# Patient Record
Sex: MALE | Race: WHITE | NOT HISPANIC OR LATINO | Employment: FULL TIME | URBAN - METROPOLITAN AREA
[De-identification: names, ages, dates, MRNs, and addresses within clinical notes are randomized per-mention and may not be internally consistent; named-entity substitution may affect disease eponyms.]

---

## 2017-05-30 ENCOUNTER — LAB CONVERSION - ENCOUNTER (OUTPATIENT)
Dept: OTHER | Facility: OTHER | Age: 42
End: 2017-05-30

## 2017-05-30 LAB
CREATININE, RANDOM URINE (HISTORICAL): 92 MG/DL (ref 20–370)
DEPRECATED RDW RBC AUTO: 14 % (ref 11–15)
HBA1C MFR BLD HPLC: 5.6 % OF TOTAL HGB
HCT VFR BLD AUTO: 46.4 % (ref 38.5–50)
HGB BLD-MCNC: 16 G/DL (ref 13.2–17.1)
MAGNESIUM, UR (HISTORICAL): 0.4 MG/DL
MCH RBC QN AUTO: 31.4 PG (ref 27–33)
MCHC RBC AUTO-ENTMCNC: 34.5 G/DL (ref 32–36)
MCV RBC AUTO: 90.8 FL (ref 80–100)
MICROALBUMIN/CREATININE RATIO (HISTORICAL): 4 MCG/MG CREAT
PLATELET # BLD AUTO: 210 THOUSAND/UL (ref 140–400)
PMV BLD AUTO: 9.1 FL (ref 7.5–12.5)
RBC # BLD AUTO: 5.11 MILLION/UL (ref 4.2–5.8)
TSH SERPL DL<=0.05 MIU/L-ACNC: 1.27 MIU/L (ref 0.4–4.5)
WBC # BLD AUTO: 7.6 THOUSAND/UL (ref 3.8–10.8)

## 2017-06-20 ENCOUNTER — ALLSCRIPTS OFFICE VISIT (OUTPATIENT)
Dept: OTHER | Facility: OTHER | Age: 42
End: 2017-06-20

## 2017-12-11 DIAGNOSIS — R73.01 IMPAIRED FASTING GLUCOSE: ICD-10-CM

## 2017-12-11 DIAGNOSIS — E78.5 HYPERLIPIDEMIA: ICD-10-CM

## 2017-12-18 ENCOUNTER — LAB CONVERSION - ENCOUNTER (OUTPATIENT)
Dept: OTHER | Facility: OTHER | Age: 42
End: 2017-12-18

## 2017-12-18 LAB
A/G RATIO (HISTORICAL): 1.6 (CALC) (ref 1–2.5)
ALBUMIN SERPL BCP-MCNC: 4.5 G/DL (ref 3.6–5.1)
ALP SERPL-CCNC: 64 U/L (ref 40–115)
ALT SERPL W P-5'-P-CCNC: 24 U/L (ref 9–46)
AST SERPL W P-5'-P-CCNC: 15 U/L (ref 10–40)
BILIRUB SERPL-MCNC: 0.4 MG/DL (ref 0.2–1.2)
BUN SERPL-MCNC: 15 MG/DL (ref 7–25)
BUN/CREA RATIO (HISTORICAL): ABNORMAL (CALC) (ref 6–22)
CALCIUM SERPL-MCNC: 9.4 MG/DL (ref 8.6–10.3)
CHLORIDE SERPL-SCNC: 107 MMOL/L (ref 98–110)
CHOLEST SERPL-MCNC: 154 MG/DL
CHOLEST/HDLC SERPL: 5.5 (CALC)
CO2 SERPL-SCNC: 25 MMOL/L (ref 20–31)
CREAT SERPL-MCNC: 0.9 MG/DL (ref 0.6–1.35)
DEPRECATED RDW RBC AUTO: 12.8 % (ref 11–15)
EGFR AFRICAN AMERICAN (HISTORICAL): 121 ML/MIN/1.73M2
EGFR-AMERICAN CALC (HISTORICAL): 104 ML/MIN/1.73M2
GAMMA GLOBULIN (HISTORICAL): 2.8 G/DL (CALC) (ref 1.9–3.7)
GLUCOSE (HISTORICAL): 108 MG/DL (ref 65–99)
HBA1C MFR BLD HPLC: 5.5 % OF TOTAL HGB
HCT VFR BLD AUTO: 45.8 % (ref 38.5–50)
HDLC SERPL-MCNC: 28 MG/DL
HGB BLD-MCNC: 16.1 G/DL (ref 13.2–17.1)
LDL CHOLESTEROL (HISTORICAL): 100 MG/DL (CALC)
MCH RBC QN AUTO: 32.5 PG (ref 27–33)
MCHC RBC AUTO-ENTMCNC: 35.2 G/DL (ref 32–36)
MCV RBC AUTO: 92.3 FL (ref 80–100)
NON-HDL-CHOL (CHOL-HDL) (HISTORICAL): 126 MG/DL (CALC)
PLATELET # BLD AUTO: 202 THOUSAND/UL (ref 140–400)
PMV BLD AUTO: 11 FL (ref 7.5–12.5)
POTASSIUM SERPL-SCNC: 4.1 MMOL/L (ref 3.5–5.3)
RBC # BLD AUTO: 4.96 MILLION/UL (ref 4.2–5.8)
SODIUM SERPL-SCNC: 141 MMOL/L (ref 135–146)
TOTAL PROTEIN (HISTORICAL): 7.3 G/DL (ref 6.1–8.1)
TRIGL SERPL-MCNC: 157 MG/DL
TSH SERPL DL<=0.05 MIU/L-ACNC: 1.15 MIU/L (ref 0.4–4.5)
WBC # BLD AUTO: 7.4 THOUSAND/UL (ref 3.8–10.8)

## 2017-12-19 ENCOUNTER — ALLSCRIPTS OFFICE VISIT (OUTPATIENT)
Dept: OTHER | Facility: OTHER | Age: 42
End: 2017-12-19

## 2017-12-20 NOTE — PROGRESS NOTES
Assessment  1  Hypertension (401 9) (I10)   2  Hyperlipidemia (272 4) (E78 5)   3  Anxiety disorder (300 00) (F41 9)   4  Asthma (493 90) (J45 909)    Plan  Anxiety disorder    · DULoxetine HCl - 20 MG Oral Capsule Delayed Release Particles (Cymbalta);TAKE ONE CAPSULE BY MOUTH EVERY DAY AND THEN WEAN OFF SLOWLY  Asthma    · ProAir  (90 Base) MCG/ACT Inhalation Aerosol Solution; inhale 2 puffs bymouth every 6 hours if needed for shortness of breath or wheezing   · Symbicort 160-4 5 MCG/ACT Inhalation Aerosol; inhale 2 puffs by mouth twice aday  Hyperlipidemia    · Atorvastatin Calcium 40 MG Oral Tablet; TAKE 1 TABLET DAILY   · A diet low in sugar may help your condition ; Status:Complete;   Done: 13NPN8840   · A diet that is low in fat, cholesterol, and sodium is considered a cardiac diet  ;Status:Complete;   Done: 74BXS8074   · Continue with our present treatment plan ; Status:Complete;   Done: 85YWL6134   · Eat a low fat and low cholesterol diet ; Status:Complete;   Done: 89ZMS1750   · Eat no more than 30 grams of fat per day ; Status:Complete;   Done: 99IBU3390   · Some eating tips that can help you lose weight ; Status:Complete;   Done: 12RDW4372   · We recommend that you bring your body mass index down to 26 ; Status:Complete;  Done: 12QLK0002   · We recommend you modify your diet to achieve and maintain a healthy weight  Balaji Saldana may increase your risk for developing health problems such as diabetes,heart disease, and cancer  Avoid high fat foods and eat a balanced diet richin fruits and vegetables  The combination of a reduced-calorie diet and increasedphysical activity is recommended  Please let us know if you would like tolearn more about your nutrition and calorie needs, and additional options includingweight loss programs that can help you achieve your goals ; Status:Complete;   Done:04Doj8679   · (1) CBC/ PLT (NO DIFF); Status:Active;  Requested GLE:93LXM5928;    · (1) COMPREHENSIVE METABOLIC PANEL; Status:Active; Requested OCJ:73EIO4500;    · (1) LIPID PANEL, FASTING; Status:Active; Requested ENR:32KKP3257;    · (1) MICROALBUMIN CREATININE RATIO, RANDOM URINE; Status:Active; Requestedfor:04Jun2018;    · (1) TSH; Status:Active; Requested EPR:89NTS4067;    · Follow-up visit in 6 months Evaluation and Treatment  Follow-up  Status: Hold For -Scheduling  Requested for: 22HXG8847  Hyperlipidemia, Impaired fasting glucose    · (1) HEMOGLOBIN A1C; Status:Active; Requested NRZ:30TPD8573;   Hypertension    · Amlodipine Besylate-Valsartan  MG Oral Tablet (Exforge); TAKE 1 TABLETDAILY    Discussion/Summary    Anxiety has been stable, he will wean off the Cymbalta gradually from 30 milligram to 20 milligram and then slowly taper off,Hypertension stable, hyperlipidemia is not improving he needs to work out and do some weight loss  Asthma has been stable  Hypertension stable, continue same medicationsLabs and follow-up 6 months  The patient was counseled regarding diagnostic results,-- instructions for management,-- risk factor reductions,-- prognosis,-- impressions,-- risks and benefits of treatment options,-- importance of compliance with treatment  Possible side effects of new medications were reviewed with the patient/guardian today  The treatment plan was reviewed with the patient/guardian  The patient/guardian understands and agrees with the treatment plan      Chief Complaint  REVIEW LABS AND MED REFILL      History of Present Illness  He is here review of labs, he has hyperlipidemia hypertension and asthma which has been stable, he has been taking Cymbalta for last 4 years and now he tries to wean off and he thinks his anxiety has been stable  He denies any headache chest pain shortness of breath  And he says he only used Symbicort 1 puff at night and that works for him,      Review of Systems   Constitutional: No fever or chills, feels well, no tiredness, no recent weight gain or weight loss  Eyes: No complaints of eye pain, no red eyes, no discharge from eyes, no itchy eyes  ENT: no complaints of earache, no hearing loss, no nosebleeds, no nasal discharge, no sore throat, no hoarseness  Cardiovascular: No complaints of slow heart rate, no fast heart rate, no chest pain, no palpitations, no leg claudication, no lower extremity  Respiratory: No complaints of shortness of breath, no wheezing, no cough, no SOB on exertion, no orthopnea or PND  Gastrointestinal: No complaints of abdominal pain, no constipation, no nausea or vomiting, no diarrhea or bloody stools  Genitourinary: No complaints of dysuria, no incontinence, no hesitancy, no nocturia, no genital lesion, no testicular pain  Musculoskeletal: No complaints of arthralgia, no myalgias, no joint swelling or stiffness, no limb pain or swelling  Integumentary: No complaints of skin rash or skin lesions, no itching, no skin wound, no dry skin  Neurological: No compliants of headache, no confusion, no convulsions, no numbness or tingling, no dizziness or fainting, no limb weakness, no difficulty walking  Psychiatric: Is not suicidal, no sleep disturbances, no anxiety or depression, no change in personality, no emotional problems  Endocrine: No complaints of proptosis, no hot flashes, no muscle weakness, no erectile dysfunction, no deepening of the voice, no feelings of weakness  Hematologic/Lymphatic: No complaints of swollen glands, no swollen glands in the neck, does not bleed easily, no easy bruising  ROS reviewed  Active Problems  1  Anxiety disorder (300 00) (F41 9)   2  Asthma (493 90) (J45 909)   3  Hyperlipidemia (272 4) (E78 5)   4  Hypertension (401 9) (I10)   5  Impaired fasting glucose (790 21) (R73 01)   6  Influenza vaccine needed (V04 81) (Z23)   7  Ventral hernia (553 20) (K43 9)    Past Medical History  1  Benign essential hypertension (401 1) (I10)   2  History of Cellulitis of ankle (682 6) (L03 119)   3   History of acute pharyngitis (V12 69) (Z87 09)   4  History of acute sinusitis (V12 69) (Z87 09)   5  History of hyperlipidemia (V12 29) (Z86 39)   6  History of Hornet/Wasp/Bee Sting (989 5)   7  History of Swollen L ankle (719 07) (M25 472)   8  History of Tick bites (919 4,E906 4) (W57 XXXA)   9  History of Well adult on routine health check (V70 0) (Z00 00)    The active problems and past medical history were reviewed and updated today  Surgical History  1  History of Hernia Repair    The surgical history was reviewed and updated today  Family History  Mother    1  Family history of Overall Condition: Good  Father    2  Family history of Adult Sleep Apnea   3  Family history of Afib   4  Family history of Stroke    The family history was reviewed and updated today  Social History   · Alcohol Use (History)   · Former smoker (V15 82) (P61 826)   · Marital History - Currently   The social history was reviewed and updated today  Current Meds   1  Amlodipine Besylate-Valsartan  MG Oral Tablet; TAKE 1 TABLET DAILY; Therapy: 20Mar2012 to (Evaluate:02Jlq6686)  Requested for: 20Jun2017; Last Rx:20Jun2017 Ordered   2  Aspir-81 81 MG Oral Tablet Delayed Release; take one tablet once daily; Therapy: 54Sgd0949 to (Evaluate:63Aiq7546); Last Rx:44Flt0621 Ordered   3  Atorvastatin Calcium 40 MG Oral Tablet; TAKE 1 TABLET DAILY; Therapy: 22CYB9829 to (Evaluate:30Fvx1410)  Requested for: 20Jun2017; Last Rx:20Jun2017 Ordered   4  ProAir  (90 Base) MCG/ACT Inhalation Aerosol Solution; inhale 2 puffs by mouth every 6 hours if needed for shortness of breath or wheezing; Therapy: 24GCR8240 to (Evaluate:24Vnw9419)  Requested for: 20Jun2017; Last Rx:20Jun2017 Ordered   5  Symbicort 160-4 5 MCG/ACT Inhalation Aerosol; inhale 2 puffs by mouth twice a day; Therapy: 47GZG6666 to (Evaluate:35Hxz5890)  Requested for: 20Jun2017; Last Rx:20Jun2017 Ordered    The medication list was reviewed and updated today  Allergies  1  Fish-EPA CAPS   2  Percocet TABS    Vitals  Vital Signs    Recorded: 04GIC9085 04:45PM   Heart Rate 89   Systolic 379   Diastolic 80   Height 5 ft 11 in   Weight 228 lb    BMI Calculated 31 8   BSA Calculated 2 23     Physical Exam   Constitutional  General appearance: No acute distress, well appearing and well nourished  Eyes  Conjunctiva and lids: No swelling, erythema, or discharge  Pupils and irises: Equal, round and reactive to light  Ears, Nose, Mouth, and Throat  External inspection of ears and nose: Normal    Nasal mucosa, septum, and turbinates: Normal without edema or erythema  Oropharynx: Abnormal   The posterior pharynx was erythematous  Pulmonary  Respiratory effort: No increased work of breathing or signs of respiratory distress  Auscultation of lungs: Clear to auscultation, equal breath sounds bilaterally, no wheezes, no rales, no rhonci  Cardiovascular  Palpation of heart: Normal PMI, no thrills  Auscultation of heart: Normal rate and rhythm, normal S1 and S2, without murmurs  Examination of extremities for edema and/or varicosities: Normal    Abdomen  Abdomen: Non-tender, no masses  Musculoskeletal  Gait and station: Normal    Digits and nails: Normal without clubbing or cyanosis  Skin  Skin and subcutaneous tissue: Normal without rashes or lesions  Neurologic  Cranial nerves: Cranial nerves 2-12 intact  Sensation: No sensory loss  Psychiatric  Orientation to person, place and time: Normal          Results/Data  (1) COMPREHENSIVE METABOLIC PANEL [id number] 50:64HA Nadia Duverney     Test Name Result Flag Reference   GLUCOSE 108 mg/dL H 65-99   Fasting reference interval   For someone without known diabetes, a glucose value between 100 and 125 mg/dL is consistent with prediabetes and should be confirmed with a follow-up test    UREA NITROGEN (BUN) 15 mg/dL  7-25   CREATININE 0 90 mg/dL  0 60-1 35   eGFR NON-AFR   AMERICAN 104 mL/min/1 73m2  > OR = 60   eGFR  121 mL/min/1 73m2  > OR = 60   BUN/CREATININE RATIO   9-02   NOT APPLICABLE (calc)   SODIUM 141 mmol/L  135-146   POTASSIUM 4 1 mmol/L  3 5-5 3   CHLORIDE 107 mmol/L     CARBON DIOXIDE 25 mmol/L  20-31   CALCIUM 9 4 mg/dL  8 6-10 3   PROTEIN, TOTAL 7 3 g/dL  6 1-8 1   ALBUMIN 4 5 g/dL  3 6-5 1   GLOBULIN 2 8 g/dL (calc)  1 9-3 7   ALBUMIN/GLOBULIN RATIO 1 6 (calc)  1 0-2 5   BILIRUBIN, TOTAL 0 4 mg/dL  0 2-1 2   ALKALINE PHOSPHATASE 64 U/L     AST 15 U/L  10-40   ALT 24 U/L  9-46     (1) LIPID PANEL, FASTING [id number] 08:20AM Liv Darby     Test Name Result Flag Reference   CHOLESTEROL, TOTAL 154 mg/dL  <200   HDL CHOLESTEROL 28 mg/dL L >40   TRIGLICERIDES 703 mg/dL H <150   LDL-CHOLESTEROL 100 mg/dL (calc) H    Reference range: <100   Desirable range <100 mg/dL for patients with CHD or diabetes and <70 mg/dL for diabetic patients with known heart disease  LDL-C is now calculated using the Oswaldo-Celis  calculation, which is a validated novel method providing  better accuracy than the Friedewald equation in the  estimation of LDL-C  Mauricio Philip  [address]  6646;933(36): [phone]  (http://Upshot/faq/FPA977)   CHOL/HDLC RATIO 5 5 (calc) H <5 0   NON HDL CHOLESTEROL 126 mg/dL (calc)  <130   For patients with diabetes plus 1 major ASCVD risk  factor, treating to a non-HDL-C goal of <100 mg/dL  (LDL-C of <70 mg/dL) is considered a therapeutic  option       (Q) CBC (H/H, RBC, INDICES, WBC, PLT) 29JFA1971 08:20AM Katelyn Tom     Test Name Result Flag Reference   WHITE BLOOD CELL COUNT 7 4 Thousand/uL  3 8-10 8   RED BLOOD CELL COUNT 4 96 Million/uL  4 20-5 80   HEMOGLOBIN 16 1 g/dL  13 2-17 1   HEMATOCRIT 45 8 %  38 5-50 0   MCV 92 3 fL  80 0-100 0   MCH 32 5 pg  27 0-33 0   MCHC 35 2 g/dL  32 0-36 0   RDW 12 8 %  11 0-15 0   PLATELET COUNT 958 Thousand/uL  140-400   MPV 11 0 fL  7 5-12 5     (Q) TSH, 3RD GENERATION [id number] 08:20AM Katelyn Tom     Test Name Result Flag Reference   TSH 1 15 mIU/L  0 40-4 50     (Q) HEMOGLOBIN A1c [id number] 08:20AM Liv Darby   REPORT COMMENT: REQ 1 OF 2 FASTING:YES     Test Name Result Flag Reference   HEMOGLOBIN A1c 5 5 % of total Hgb  <5 7   For the purpose of screening for the presence of diabetes:   <5 7%       Consistent with the absence of diabetes 5 7-6 4%    Consistent with increased risk for diabetes             (prediabetes) > or =6 5%  Consistent with diabetes   This assay result is consistent with a decreased risk of diabetes  Currently, no consensus exists regarding use of hemoglobin A1c for diagnosis of diabetes in children  According to American Diabetes Association (ADA) guidelines, hemoglobin A1c <7 0% represents optimal control in non-pregnant diabetic patients  Different metrics may apply to specific patient populations  Standards of Medical Care in Diabetes(ADA)       Signatures   Electronically signed by : SPEEDY Moss ; Dec 19 2017  5:06PM EST                       (Author)

## 2018-01-13 VITALS
DIASTOLIC BLOOD PRESSURE: 74 MMHG | BODY MASS INDEX: 31.92 KG/M2 | RESPIRATION RATE: 16 BRPM | WEIGHT: 228 LBS | SYSTOLIC BLOOD PRESSURE: 136 MMHG | HEIGHT: 71 IN | HEART RATE: 82 BPM

## 2018-01-15 NOTE — RESULT NOTES
Verified Results  (1) COMPREHENSIVE METABOLIC PANEL [id number] 70:12TY Lazaro Fontaine     Test Name Result Flag Reference   GLUCOSE 105 mg/dL H 65-99   Fasting reference interval   UREA NITROGEN (BUN) 13 mg/dL  7-25   CREATININE 0 98 mg/dL  0 60-1 35   eGFR NON-AFR  AMERICAN 95 mL/min/1 73m2  > OR = 60   eGFR AFRICAN AMERICAN 111 mL/min/1 73m2  > OR = 60   BUN/CREATININE RATIO   6-66   NOT APPLICABLE (calc)   SODIUM 141 mmol/L  135-146   POTASSIUM 4 0 mmol/L  3 5-5 3   CHLORIDE 107 mmol/L     CARBON DIOXIDE 22 mmol/L  19-30   CALCIUM 8 9 mg/dL  8 6-10 3   PROTEIN, TOTAL 7 1 g/dL  6 1-8 1   ALBUMIN 4 3 g/dL  3 6-5 1   GLOBULIN 2 8 g/dL (calc)  1 9-3 7   ALBUMIN/GLOBULIN RATIO 1 5 (calc)  1 0-2 5   BILIRUBIN, TOTAL 0 6 mg/dL  0 2-1 2   ALKALINE PHOSPHATASE 60 U/L     AST 20 U/L  10-40   ALT 28 U/L  9-46     (1) LIPID PANEL, FASTING [id number] 08:22AM Liv Darby     Test Name Result Flag Reference   CHOLESTEROL, TOTAL 186 mg/dL  125-200   HDL CHOLESTEROL 29 mg/dL L > OR = 40   TRIGLICERIDES 336 mg/dL H <150   LDL-CHOLESTEROL 112 mg/dL (calc)  <130   Desirable range <100 mg/dL for patients with CHD or  diabetes and <70 mg/dL for diabetic patients with  known heart disease  CHOL/HDLC RATIO 6 4 (calc) H < OR = 5 0   NON HDL CHOLESTEROL 157 mg/dL (calc)     Target for non-HDL cholesterol is 30 mg/dL higher than   LDL cholesterol target       (Q) CBC (H/H, RBC, INDICES, WBC, PLT) [id number] 08:22AM Lazaro Fontaine     Test Name Result Flag Reference   WHITE BLOOD CELL COUNT 6 6 Thousand/uL  3 8-10 8   RED BLOOD CELL COUNT 5 05 Million/uL  4 20-5 80   HEMOGLOBIN 15 3 g/dL  13 2-17 1   HEMATOCRIT 46 9 %  38 5-50 0   MCV 92 8 fL  80 0-100 0   MCH 30 3 pg  27 0-33 0   MCHC 32 6 g/dL  32 0-36 0   RDW 13 9 %  11 0-15 0   PLATELET COUNT 726 Thousand/uL  140-400   MPV 9 8 fL  7 5-11 5     (Q) TSH, 3RD GENERATION [id number] 08:22AM Lazaro Fontaine     Test Name Result Flag Reference   TSH 1 38 mIU/L  0 40-4 50     (Q) HEMOGLOBIN A1c [id number] 08:22AM Liv Darby   REPORT COMMENT:  FASTING:YES     Test Name Result Flag Reference   HEMOGLOBIN A1c 5 9 % of total Hgb H <5 7   According to ADA guidelines, hemoglobin A1c <7 0%  represents optimal control in non-pregnant diabetic  patients  Different metrics may apply to specific  patient populations  Standards of Medical Care in    Diabetes Care  2013;36:s11-s66     For the purpose of screening for the presence of  diabetes  <5 7%       Consistent with the absence of diabetes  5 7-6 4%    Consistent with increased risk for diabetes              (prediabetes)  >or=6 5%    Consistent with diabetes     This assay result is consistent with an increased risk  of diabetes  Currently, no consensus exists for use of hemoglobin  A1c for diagnosis of diabetes for children

## 2018-01-17 NOTE — RESULT NOTES
Verified Results  (1) LIPID PANEL, FASTING 29Oct2016 08:39AM Suni Ray     Test Name Result Flag Reference   CHOLESTEROL, TOTAL 144 mg/dL  125-200   HDL CHOLESTEROL 28 mg/dL L > OR = 40   TRIGLICERIDES 854 mg/dL H <150   LDL-CHOLESTEROL 86 mg/dL (calc)  <130   Desirable range <100 mg/dL for patients with CHD or  diabetes and <70 mg/dL for diabetic patients with  known heart disease  CHOL/HDLC RATIO 5 1 (calc) H < OR = 5 0   NON HDL CHOLESTEROL 116 mg/dL (calc)     Target for non-HDL cholesterol is 30 mg/dL higher than   LDL cholesterol target  (1) COMPREHENSIVE METABOLIC PANEL [id number] 31:80JL Suni Ray     Test Name Result Flag Reference   GLUCOSE 109 mg/dL H 65-99   Fasting reference interval   UREA NITROGEN (BUN) 17 mg/dL  7-25   CREATININE 1 09 mg/dL  0 60-1 35   eGFR NON-AFR  AMERICAN 83 mL/min/1 73m2  > OR = 60   eGFR AFRICAN AMERICAN 97 mL/min/1 73m2  > OR = 60   BUN/CREATININE RATIO   2-55   NOT APPLICABLE (calc)   SODIUM 139 mmol/L  135-146   POTASSIUM 4 1 mmol/L  3 5-5 3   CHLORIDE 104 mmol/L     CARBON DIOXIDE 28 mmol/L  20-31   CALCIUM 9 2 mg/dL  8 6-10 3   PROTEIN, TOTAL 7 0 g/dL  6 1-8 1   ALBUMIN 4 4 g/dL  3 6-5 1   GLOBULIN 2 6 g/dL (calc)  1 9-3 7   ALBUMIN/GLOBULIN RATIO 1 7 (calc)  1 0-2 5   BILIRUBIN, TOTAL 0 5 mg/dL  0 2-1 2   ALKALINE PHOSPHATASE 69 U/L     AST 14 U/L  10-40   ALT 22 U/L  9-46     (Q) HEMOGLOBIN A1c 29Oct2016 08:39AM Liv Darby   REPORT COMMENT:  FASTING:YES     Test Name Result Flag Reference   HEMOGLOBIN A1c 5 9 % of total Hgb H <5 7   According to ADA guidelines, hemoglobin A1c <7 0%  represents optimal control in non-pregnant diabetic  patients  Different metrics may apply to specific  patient populations  Standards of Medical Care in  [phone]  Diabetes Care   2013;36:s11-s66     For the purpose of screening for the presence of  diabetes  <5 7%       Consistent with the absence of diabetes  5 7-6 4%    Consistent with increased risk for diabetes              (prediabetes)  >or=6 5%    Consistent with diabetes     This assay result is consistent with an increased risk  of diabetes  Currently, no consensus exists for use of hemoglobin  A1c for diagnosis of diabetes for children

## 2018-01-23 VITALS
HEIGHT: 71 IN | DIASTOLIC BLOOD PRESSURE: 80 MMHG | BODY MASS INDEX: 31.92 KG/M2 | HEART RATE: 89 BPM | SYSTOLIC BLOOD PRESSURE: 125 MMHG | WEIGHT: 228 LBS

## 2018-02-13 ENCOUNTER — OFFICE VISIT (OUTPATIENT)
Dept: FAMILY MEDICINE CLINIC | Facility: CLINIC | Age: 43
End: 2018-02-13
Payer: COMMERCIAL

## 2018-02-13 VITALS
HEART RATE: 92 BPM | SYSTOLIC BLOOD PRESSURE: 140 MMHG | WEIGHT: 218 LBS | OXYGEN SATURATION: 97 % | RESPIRATION RATE: 16 BRPM | DIASTOLIC BLOOD PRESSURE: 78 MMHG | BODY MASS INDEX: 30.52 KG/M2 | HEIGHT: 71 IN

## 2018-02-13 DIAGNOSIS — R53.83 FATIGUE DUE TO SLEEP PATTERN DISTURBANCE: ICD-10-CM

## 2018-02-13 DIAGNOSIS — R06.83 SNORING: ICD-10-CM

## 2018-02-13 DIAGNOSIS — G47.30 SLEEP APNEA IN ADULT: Primary | ICD-10-CM

## 2018-02-13 DIAGNOSIS — G47.9 FATIGUE DUE TO SLEEP PATTERN DISTURBANCE: ICD-10-CM

## 2018-02-13 PROCEDURE — 99214 OFFICE O/P EST MOD 30 MIN: CPT | Performed by: FAMILY MEDICINE

## 2018-02-13 RX ORDER — AMLODIPINE AND VALSARTAN 10; 160 MG/1; MG/1
TABLET ORAL
Refills: 0 | COMMUNITY
Start: 2017-12-23 | End: 2018-06-19 | Stop reason: SDUPTHER

## 2018-02-13 RX ORDER — DULOXETIN HYDROCHLORIDE 20 MG/1
CAPSULE, DELAYED RELEASE ORAL
Refills: 0 | COMMUNITY
Start: 2017-12-19 | End: 2018-02-13

## 2018-02-13 RX ORDER — DULOXETIN HYDROCHLORIDE 20 MG/1
CAPSULE, DELAYED RELEASE ORAL
COMMUNITY
Start: 2017-12-19 | End: 2018-02-13

## 2018-02-13 RX ORDER — INFLUENZA A VIRUS A/SINGAPORE/GP1908/2015 IVR-180A (H1N1) ANTIGEN (PROPIOLACTONE INACTIVATED), INFLUENZA A VIRUS A/HONG KONG/4801/2014 X-263B (H3N2) ANTIGEN (PROPIOLACTONE INACTIVATED), INFLUENZA B VIRUS B/BRISBANE/46/2015 ANTIGEN (PROPIOLACTONE INACTIVATED), AND INFLUENZA B VIRUS B/PHUKET/3073/2013 BVR-1B ANTIGEN (PROPIOLACTONE INACTIVATED) 15; 15; 15; 15 UG/.5ML; UG/.5ML; UG/.5ML; UG/.5ML
INJECTION, SUSPENSION INTRAMUSCULAR
Refills: 0 | COMMUNITY
Start: 2018-01-07 | End: 2018-06-19

## 2018-02-13 RX ORDER — ATORVASTATIN CALCIUM 40 MG/1
40 TABLET, FILM COATED ORAL DAILY
Refills: 0 | COMMUNITY
Start: 2018-01-27 | End: 2018-06-19 | Stop reason: SDUPTHER

## 2018-02-13 RX ORDER — BUDESONIDE AND FORMOTEROL FUMARATE DIHYDRATE 160; 4.5 UG/1; UG/1
2 AEROSOL RESPIRATORY (INHALATION) 2 TIMES DAILY
Refills: 0 | COMMUNITY
Start: 2017-12-19 | End: 2018-06-19 | Stop reason: SDUPTHER

## 2018-02-13 NOTE — PROGRESS NOTES
Assessment/Plan:    Problem List Items Addressed This Visit     Snoring    Relevant Orders    Ambulatory referral to Sleep Medicine    Diagnostic Sleep Study    Fatigue due to sleep pattern disturbance    Relevant Orders    Ambulatory referral to Sleep Medicine    Diagnostic Sleep Study    Sleep apnea in adult - Primary     Daytime fatigue, snoring and sleep apnea witnessed by the wife, will get the sleep study done         Relevant Orders    Ambulatory referral to Sleep Medicine    Diagnostic Sleep Study          Chief Complaint   Patient presents with    Snoring     discuss sleep apnea       Subjective:   Patient ID: Santiago Thorpe is a 36 y o  male  He says he has been snoring for last 20 years but now lately has been feeling more tired during the daytime and feels more sleepy does not take naps because he works all day and he says that his wife noticed he has been snoring and holding his breath and then start breathing again he also says that he noted on his feet but that only sleeps about 2 hours during the night but he thinks he was sleeping more and he wants to get his sleep study done  He has hypertension and he has asthma he has been doing well with both conditions and he uses inhaler regularly and blood pressure medication  He is a ex-smoker        Review of Systems   Constitutional: Negative for activity change, appetite change, chills, diaphoresis, fatigue (day time), fever and unexpected weight change  HENT: Negative for congestion, dental problem, ear discharge, ear pain, facial swelling, hearing loss, mouth sores, nosebleeds, postnasal drip, rhinorrhea, sinus pain, sinus pressure, sneezing, sore throat, trouble swallowing and voice change  Eyes: Negative for photophobia, pain, discharge, redness and itching  Respiratory: Positive for apnea  Negative for cough, chest tightness, shortness of breath and wheezing           Snoring   Cardiovascular: Negative for chest pain, palpitations and leg swelling  Gastrointestinal: Negative for abdominal distention, abdominal pain, blood in stool, constipation, diarrhea and nausea  Endocrine: Negative for cold intolerance, heat intolerance, polydipsia, polyphagia and polyuria  Genitourinary: Negative for dysuria, flank pain, frequency, hematuria and urgency  Musculoskeletal: Negative for arthralgias, back pain, myalgias and neck pain  Skin: Negative for color change and pallor  Allergic/Immunologic: Negative for environmental allergies and food allergies  Neurological: Negative for dizziness, weakness, light-headedness, numbness and headaches  Hematological: Negative for adenopathy  Does not bruise/bleed easily  Psychiatric/Behavioral: Negative for behavioral problems, sleep disturbance and suicidal ideas  The patient is not nervous/anxious  Objective:  Physical Exam   Constitutional: He appears well-developed and well-nourished  HENT:   Head: Normocephalic and atraumatic  Right Ear: External ear normal    Left Ear: External ear normal    Mouth/Throat: Oropharynx is clear and moist    Eyes: Conjunctivae and EOM are normal  Pupils are equal, round, and reactive to light  No scleral icterus  Neck: Normal range of motion  Neck supple  No thyromegaly present  Cardiovascular: Normal rate, regular rhythm and normal heart sounds  Pulmonary/Chest: Effort normal and breath sounds normal  He has no wheezes  He has no rales  Lymphadenopathy:     He has no cervical adenopathy  Neurological: He is alert  Skin: No rash noted  No erythema  Psychiatric: He has a normal mood and affect            Past Surgical History:   Procedure Laterality Date    HERNIA REPAIR         Family History   Problem Relation Age of Onset    Sleep apnea Father      Adult    Atrial fibrillation Father     Stroke Father          Current Outpatient Prescriptions:     AFLURIA QUADRIVALENT 0 5 ML DAVE, inject 0 5 milliliter intramuscularly, Disp: , Rfl: 0    amLODIPine-valsartan (EXFORGE)  MG per tablet, , Disp: , Rfl: 0    aspirin 81 MG tablet, Take 1 tablet by mouth daily, Disp: , Rfl:     atorvastatin (LIPITOR) 40 mg tablet, Take 40 mg by mouth daily, Disp: , Rfl: 0    PROAIR  (90 Base) MCG/ACT inhaler, INHALE 2 PUFFS EVERY 6 HOURS IF NEEDED FOR SHORTNESS OF BREATH OR WHEEZING, Disp: , Rfl: 0    SYMBICORT 160-4 5 MCG/ACT inhaler, Inhale 2 puffs 2 (two) times a day, Disp: , Rfl: 0    No Known Allergies    Vitals:    02/13/18 1117   BP: 140/78   Pulse: 92   Resp: 16   SpO2: 97%   Weight: 98 9 kg (218 lb)   Height: 5' 11" (1 803 m)

## 2018-03-22 ENCOUNTER — HOSPITAL ENCOUNTER (OUTPATIENT)
Dept: SLEEP CENTER | Facility: CLINIC | Age: 43
Discharge: HOME | End: 2018-03-22
Payer: COMMERCIAL

## 2018-03-22 DIAGNOSIS — G47.9 FATIGUE DUE TO SLEEP PATTERN DISTURBANCE: ICD-10-CM

## 2018-03-22 DIAGNOSIS — R06.83 SNORING: ICD-10-CM

## 2018-03-22 DIAGNOSIS — G47.30 SLEEP APNEA IN ADULT: ICD-10-CM

## 2018-03-22 DIAGNOSIS — R53.83 FATIGUE DUE TO SLEEP PATTERN DISTURBANCE: ICD-10-CM

## 2018-03-22 PROCEDURE — 95810 POLYSOM 6/> YRS 4/> PARAM: CPT

## 2018-03-23 PROBLEM — G47.33 OSA (OBSTRUCTIVE SLEEP APNEA): Status: ACTIVE | Noted: 2018-02-13

## 2018-03-23 NOTE — PROGRESS NOTES
Sleep Study Documentation    Pre-Sleep Study       Sleep testing procedure explained to patient:YES    Patient napped prior to study:NO    Caffeine:Dayshift worker after 12PM   Caffeine use:YES- soda  12 ounces    Alcohol:Dayshift workers after 5PM: Alcohol use:NO    Typical day for patient:YES       Study Documentation  Diagnostic   Snore:Severe  Supplemental O2: no    Minimum SaO2 83%  Baseline SaO2 96%            EKG abnormalities: no     EEG abnormalities: no    Study Terminated:no    Patient classification: employed       Post-Sleep Study    Medication used at bedtime or during sleep study:YES other prescription medications    Patient reports time it took to fall asleep:20 to 30 minutes    Patient reports waking up during study:3 or more times  Patient reports returning to sleep in 10 to 30 minutes  Patient reports sleeping 4 to 6 hours without dreaming  Patient reports sleep during study:typical    Patient rated sleepiness: Somewhat sleepy or tired    PAP treatment:no

## 2018-03-30 ENCOUNTER — TELEPHONE (OUTPATIENT)
Dept: SLEEP CENTER | Facility: CLINIC | Age: 43
End: 2018-03-30

## 2018-03-30 NOTE — TELEPHONE ENCOUNTER
Informed patient of obstructive sleep apnea on sleep study  He will keep consult appointment on 4/4 as scheduled

## 2018-04-04 ENCOUNTER — OFFICE VISIT (OUTPATIENT)
Dept: SLEEP CENTER | Facility: CLINIC | Age: 43
End: 2018-04-04

## 2018-04-04 VITALS
SYSTOLIC BLOOD PRESSURE: 120 MMHG | BODY MASS INDEX: 33.12 KG/M2 | DIASTOLIC BLOOD PRESSURE: 84 MMHG | HEART RATE: 72 BPM | HEIGHT: 71 IN | WEIGHT: 236.6 LBS

## 2018-04-04 DIAGNOSIS — J45.20 MILD INTERMITTENT ASTHMA WITHOUT COMPLICATION: ICD-10-CM

## 2018-04-04 DIAGNOSIS — R06.83 SNORING: ICD-10-CM

## 2018-04-04 DIAGNOSIS — G47.10 HYPERSOMNIA: ICD-10-CM

## 2018-04-04 DIAGNOSIS — R73.01 IMPAIRED FASTING GLUCOSE: ICD-10-CM

## 2018-04-04 DIAGNOSIS — G47.9 FATIGUE DUE TO SLEEP PATTERN DISTURBANCE: ICD-10-CM

## 2018-04-04 DIAGNOSIS — R53.83 FATIGUE DUE TO SLEEP PATTERN DISTURBANCE: ICD-10-CM

## 2018-04-04 DIAGNOSIS — G47.33 OSA (OBSTRUCTIVE SLEEP APNEA): Primary | ICD-10-CM

## 2018-04-04 DIAGNOSIS — I10 ESSENTIAL HYPERTENSION: ICD-10-CM

## 2018-04-04 DIAGNOSIS — E66.9 OBESITY (BMI 30-39.9): ICD-10-CM

## 2018-04-04 NOTE — PROGRESS NOTES
Review of Systems      Genitourinary none   Cardiology none   Gastrointestinal none   Neurology none   Constitutional none   Integumentary none   Psychiatry none   Musculoskeletal none   Pulmonary wheezing   ENT nasal or sinus congestion   Endocrine none   Hematological none

## 2018-04-04 NOTE — PROGRESS NOTES
Consultation - [address]  36 y o  male  :1975  [id number]    Physician Requesting Consult: Tylor Marin MD     Reason for Consult : [At your kind request] I saw this patient for initial sleep evaluation today  A diagnostic sleep study was undertaken and patient is here to review results and treatment options  The study demonstrated an AHI of 35 / hour, higher while supine with minimum oxygen saturation of 83% and he spent 26% of total sleep time with saturations less than 90%  There were also [frequent] spontaneous arousals: Index of 75 / hour  Medications, Past, Family and Social Histories were reviewed  He  has a past medical history of Benign essential hypertension (2006); Cellulitis of ankle; Hyperlipidemia (2006); and Sting from hornet, wasp, or bee  He has a current medication list which includes the following prescription(s): amlodipine-valsartan, aspirin, atorvastatin, proair hfa, symbicort, and afluria quadrivalent  HPI:  He has study was undertaken because of his complaints of constant fatigue and he has wife witnessed apneas  He has also been snoring for around 10 years  He is not aware of breathing difficulties during sleep or modifying factors  He reported no features of movement disorder  He has had 2 episodes of acting out dream content in the remote past   There is no real report of injury to himself or others  He reported no other features of parasomnia  Sleep Routine:  He is spending 7 hr or more in bed  He typically falls asleep in less than 15 min  Sleep is interrupted 0-2 times a night  He awakens with the aid of an alarm feeling rested at 1st but is tired by midmorning  He he has excessive daytime drowsiness and tends to doze off when sedentary at work  He rated himself at Total score: 9 /24 on the [place] Sleepiness Scale  Habits:  [ reports that he has quit smoking   He has never used smokeless tobacco ], [ reports that he drinks alcohol ], [ reports that he does not use drugs ], [he uses limited caffeine ]   Family History:  His father has obstructive sleep apnea    ROS: reviewed & as attached  [Significant for recent upper respiratory infection with mild excessive a shin of his asthma  He states weight has been stable ]     EXAM: Blood pressure 120/84, pulse 72, height 5' 11" (1 803 m), weight 107 kg (236 lb 9 6 oz)  Body mass index is 33 kg/m²  General:This is a [well] groomed male, well appearing [at] their stated age, in no distress  Speech is clear and coherent  Psych: Alert, orientated & cooperative  Mental state appears normal Judgement & insight [are good]  Extremities:Skin is warm and dry  Color and hydration are good  There is [no] pedal edema  Head and neck: Craniofacial anatomy [is normal]  TMJ & Sinuses are normal  Neck Circumference: 42 cm,  [appears thick] and muscular  There [are no abnormal masses or] Lymhadenopathy  Thyroid is [normal]  Trachea is [central]  Nasal airway: [patent]  Septum is [central ] Mucous membranes appeared [normal]  Turbinates are [normal ]  There is [no] rhinorrhea  Oral airway: [is crowded ] Base of tongue is at Modified Mallampati class III  Hard palate [appears normal]  Soft palate [is redundant]  There is [no] tonsillar hypertrophy  Larrie Linger are normal]  CVS: Heart sounds are [regular]  There [are no] murmur[s]  Resp: Effort is [normal]  Air entry is [good] bilaterally  There are [no] wheezes or rales  Abdomen: obese, soft & non-tender  CNS: is intact  Gait is normal  Rombergs is negative  MSK: Muscle bulk, tone and power are [normal]  IMPRESSION:     1  JOSEE (obstructive sleep apnea)  CPAP Study   2  Snoring  Ambulatory referral to Sleep Medicine   3  Fatigue due to sleep pattern disturbance  Ambulatory referral to Sleep Medicine   4  Hypersomnia     5  Obesity (BMI 30-39 9)     6  Mild intermittent asthma without complication     7   Essential hypertension          PLAN:   1  I reviewed results of the Sleep study with the patient  2  With respect to above conditions, I counseled on pathophysiology, diagnosis, treatment options, risks and benefits; inter-relationship and effects on symptoms and comorbidities; risks of no treatment; costs and insurance aspects  3  I also advised on weight reduction  4  Patient elected positive airway pressure therapy and is to be scheduled for a titration study  5  Follow-up to be scheduled after the study to review results and to initiate therapy           Sincerely,    Emory Guerra MD  Board Certified Specialist

## 2018-05-13 ENCOUNTER — HOSPITAL ENCOUNTER (OUTPATIENT)
Dept: SLEEP CENTER | Facility: CLINIC | Age: 43
Discharge: HOME | End: 2018-05-13
Payer: COMMERCIAL

## 2018-05-13 DIAGNOSIS — G47.33 OSA (OBSTRUCTIVE SLEEP APNEA): ICD-10-CM

## 2018-05-13 PROCEDURE — 95811 POLYSOM 6/>YRS CPAP 4/> PARM: CPT

## 2018-05-14 NOTE — PROGRESS NOTES
Sleep Study Documentation    Pre-Sleep Study       Sleep testing procedure explained to patient:YES    Patient napped prior to study:NO    Caffeine:Dayshift worker after 12PM   Caffeine use:YES- soda  12 ounces    Alcohol:Dayshift workers after 5PM: Alcohol use:NO    Typical day for patient:YES       Study Documentation    Treatment   Optimal PAP pressure:  +16  Leak:None  Snore:Eliminated at +10  REM Obtained:yes (at +16 laterally)    Supplemental O2: no    Minimum SaO2:  85%  Baseline SaO2:   95%  Minimum SaO2 at final PAP pressure:  90%    PAP masks tried:  [employer] F20 (med & large), Resmed Quattro (med & large)  PAP mask choice (final):  Resmed Airfit F20 (med) full-face    Mode of Therapy:CPAP     ETCO2:No     CPAP changed to BiPAP:No    EKG abnormalities: no     EEG abnormalities: no    Study Terminated:no    Patient classification: employed       Post-Sleep Study    Medication used at bedtime or during sleep study:NO    Patient reports time it took to fall asleep:20 to 30 minutes    Patient reports waking up during study:3 or more times  Patient reports returning to sleep in 10 to 30 minutes  Patient reports sleeping 4 to 6 hours without dreaming  Patient reports sleep during study:better than usual    Patient rated sleepiness: Not sleepy or tired    PAP treatment:yes: Post PAP treatment patient reports feeling better and  would wear PAP mask at home

## 2018-05-15 ENCOUNTER — TELEPHONE (OUTPATIENT)
Dept: SLEEP CENTER | Facility: CLINIC | Age: 43
End: 2018-05-15

## 2018-05-15 DIAGNOSIS — G47.33 OSA (OBSTRUCTIVE SLEEP APNEA): Primary | ICD-10-CM

## 2018-05-21 ENCOUNTER — OFFICE VISIT (OUTPATIENT)
Dept: SLEEP CENTER | Facility: CLINIC | Age: 43
End: 2018-05-21

## 2018-05-21 ENCOUNTER — HOSPITAL ENCOUNTER (OUTPATIENT)
Dept: SLEEP CENTER | Facility: CLINIC | Age: 43
Discharge: HOME | End: 2018-05-21

## 2018-05-21 VITALS
HEART RATE: 72 BPM | WEIGHT: 233 LBS | HEIGHT: 71 IN | BODY MASS INDEX: 32.62 KG/M2 | SYSTOLIC BLOOD PRESSURE: 110 MMHG | DIASTOLIC BLOOD PRESSURE: 68 MMHG

## 2018-05-21 DIAGNOSIS — I10 ESSENTIAL HYPERTENSION: ICD-10-CM

## 2018-05-21 DIAGNOSIS — G47.10 HYPERSOMNIA: ICD-10-CM

## 2018-05-21 DIAGNOSIS — R73.01 IMPAIRED FASTING GLUCOSE: ICD-10-CM

## 2018-05-21 DIAGNOSIS — J45.20 MILD INTERMITTENT ASTHMA WITHOUT COMPLICATION: ICD-10-CM

## 2018-05-21 DIAGNOSIS — E66.9 OBESITY (BMI 30-39.9): ICD-10-CM

## 2018-05-21 DIAGNOSIS — G47.33 OSA (OBSTRUCTIVE SLEEP APNEA): Primary | ICD-10-CM

## 2018-05-21 NOTE — PROGRESS NOTES
Follow-up Note - [address]  36 y o  male  :1975  [id number]    I saw Eliud Childress in sleep clinic today for his sleep disordered breathing, coexisting sleep complaints & medical conditions  He had a therapeutic] sleep studies [and is here to review results and to initiate therapy]  During the subsequent therapeutic study, sleep disordered breathing was sufficiently remediated with PAP at 16 cm H2O  He was observed during stage REM but not while supine  PFSH, Problem List, Medications & Allergies were reviewed in EMR  Interval changes: [none reported ]   He  has a past medical history of Benign essential hypertension (2006); Cellulitis of ankle; Hyperlipidemia (2006); and Sting from hornet, wasp, or bee  He has a current medication list which includes the following prescription(s): amlodipine-valsartan, aspirin, atorvastatin, proair hfa, symbicort, and afluria quadrivalent  ROS: reviewed see attached  Asthma is controlled  HPI:  Patient had [no] difficulty tolerating positive airway pressure therapy on the titration study night  Reported [no] nasal congestion, [no] mucosal dryness, [no] chest or abdominal discomfort  [He] tolerated positive airway pressure therapy [and] sleep was better than usual and felt [more energy] the next day  Sleep Routine:  [No interval changes:  He is getting 8 hr sleep but is not refreshed ]  [He has [and] rated himself at Total score: 8 /24 on the [place] sleepiness scale ]  He feels like he could nap and nods off when sedentary at his desk  On Exam: /68   Pulse 72   Ht 5' 11" (1 803 m)   Wt 106 kg (233 lb)   BMI 32 50 kg/m²   Neck Circumference: 42cm  Patient is [well groomed] alert, orientated, cooperative [and in no distress]  Mental state [appeared normal]  Physical findings are essentially unchanged as documented in the initial encounter  IMPRESSION:    1  JOSEE (obstructive sleep apnea)     2  Hypersomnia     3  Obesity (BMI 30-39 9)     4  Essential hypertension     5  Impaired fasting glucose     6  Mild intermittent asthma without complication         PLAN:    1  I reviewed results of the Sleep studies with the patient  2  With respect to above conditions, I counseled on pathophysiology, diagnosis, treatment options, risks and benefits; inter-relationship and effects on symptoms and comorbidities; risks of no treatment; costs and insurance aspects  3  Patient elected positive airway pressure therapy and [care coordinated with the [facility] provider for set up in clinic today]  4  Need for compliance with therapy and weight reduction were emphasized  5  Follow-up to be scheduled in 2 months to monitor compliance, progress and to adjust therapy  Thank you for allowing me to participate in the care of this patient  I will keep you apprised of developments      Sincerely,    Authenticated electronically by Teena Ruiz MD on 46/21/88   Board Certified Specialist

## 2018-05-21 NOTE — PROGRESS NOTES
Review of Systems      Genitourinary none   Cardiology none   Gastrointestinal none   Neurology none   Constitutional fatigue and excessive sweating at night   Integumentary none   Psychiatry none   Musculoskeletal none   Pulmonary snoring   ENT ringing in ears   Endocrine none   Hematological none

## 2018-06-04 DIAGNOSIS — E78.5 HYPERLIPIDEMIA: ICD-10-CM

## 2018-06-04 DIAGNOSIS — R73.01 IMPAIRED FASTING GLUCOSE: ICD-10-CM

## 2018-06-11 LAB
ALBUMIN SERPL-MCNC: 4.2 G/DL (ref 3.6–5.1)
ALBUMIN/CREAT UR: 13 MCG/MG CREAT
ALBUMIN/GLOB SERPL: 1.7 (CALC) (ref 1–2.5)
ALP SERPL-CCNC: 55 U/L (ref 40–115)
ALT SERPL-CCNC: 23 U/L (ref 9–46)
AST SERPL-CCNC: 13 U/L (ref 10–40)
BILIRUB SERPL-MCNC: 0.5 MG/DL (ref 0.2–1.2)
BUN SERPL-MCNC: 12 MG/DL (ref 7–25)
BUN/CREAT SERPL: ABNORMAL (CALC) (ref 6–22)
CALCIUM SERPL-MCNC: 9 MG/DL (ref 8.6–10.3)
CHLORIDE SERPL-SCNC: 107 MMOL/L (ref 98–110)
CHOLEST SERPL-MCNC: 137 MG/DL
CHOLEST/HDLC SERPL: 5.3 (CALC)
CO2 SERPL-SCNC: 26 MMOL/L (ref 20–31)
CREAT SERPL-MCNC: 0.99 MG/DL (ref 0.6–1.35)
CREAT UR-MCNC: 31 MG/DL (ref 20–370)
ERYTHROCYTE [DISTWIDTH] IN BLOOD BY AUTOMATED COUNT: 12.8 % (ref 11–15)
GLOBULIN SER CALC-MCNC: 2.5 G/DL (CALC) (ref 1.9–3.7)
GLUCOSE SERPL-MCNC: 105 MG/DL (ref 65–99)
HBA1C MFR BLD: 5.6 % OF TOTAL HGB
HCT VFR BLD AUTO: 43.7 % (ref 38.5–50)
HDLC SERPL-MCNC: 26 MG/DL
HGB BLD-MCNC: 15 G/DL (ref 13.2–17.1)
LDLC SERPL CALC-MCNC: 84 MG/DL (CALC)
MCH RBC QN AUTO: 32.1 PG (ref 27–33)
MCHC RBC AUTO-ENTMCNC: 34.3 G/DL (ref 32–36)
MCV RBC AUTO: 93.6 FL (ref 80–100)
MICROALBUMIN UR-MCNC: 0.4 MG/DL
NONHDLC SERPL-MCNC: 111 MG/DL (CALC)
PLATELET # BLD AUTO: 191 THOUSAND/UL (ref 140–400)
PMV BLD REES-ECKER: 10.9 FL (ref 7.5–12.5)
POTASSIUM SERPL-SCNC: 4.4 MMOL/L (ref 3.5–5.3)
PROT SERPL-MCNC: 6.7 G/DL (ref 6.1–8.1)
RBC # BLD AUTO: 4.67 MILLION/UL (ref 4.2–5.8)
SL AMB EGFR AFRICAN AMERICAN: 108 ML/MIN/1.73M2
SL AMB EGFR NON AFRICAN AMERICAN: 93 ML/MIN/1.73M2
SODIUM SERPL-SCNC: 142 MMOL/L (ref 135–146)
TRIGL SERPL-MCNC: 167 MG/DL
TSH SERPL-ACNC: 1.14 MIU/L (ref 0.4–4.5)
WBC # BLD AUTO: 6.9 THOUSAND/UL (ref 3.8–10.8)

## 2018-06-19 ENCOUNTER — OFFICE VISIT (OUTPATIENT)
Dept: FAMILY MEDICINE CLINIC | Facility: CLINIC | Age: 43
End: 2018-06-19
Payer: COMMERCIAL

## 2018-06-19 VITALS
WEIGHT: 226 LBS | SYSTOLIC BLOOD PRESSURE: 136 MMHG | HEART RATE: 73 BPM | BODY MASS INDEX: 31.64 KG/M2 | OXYGEN SATURATION: 98 % | HEIGHT: 71 IN | DIASTOLIC BLOOD PRESSURE: 72 MMHG | RESPIRATION RATE: 16 BRPM

## 2018-06-19 DIAGNOSIS — G47.33 OSA (OBSTRUCTIVE SLEEP APNEA): ICD-10-CM

## 2018-06-19 DIAGNOSIS — I10 ESSENTIAL HYPERTENSION: ICD-10-CM

## 2018-06-19 DIAGNOSIS — E78.2 MIXED HYPERLIPIDEMIA: ICD-10-CM

## 2018-06-19 DIAGNOSIS — R73.01 IMPAIRED FASTING GLUCOSE: Primary | ICD-10-CM

## 2018-06-19 DIAGNOSIS — Z23 NEED FOR PROPHYLACTIC VACCINATION WITH TETANUS-DIPHTHERIA (TD): ICD-10-CM

## 2018-06-19 DIAGNOSIS — E66.9 OBESITY (BMI 30-39.9): ICD-10-CM

## 2018-06-19 DIAGNOSIS — J45.20 MILD INTERMITTENT ASTHMA WITHOUT COMPLICATION: ICD-10-CM

## 2018-06-19 PROBLEM — G47.9 FATIGUE DUE TO SLEEP PATTERN DISTURBANCE: Status: RESOLVED | Noted: 2018-02-13 | Resolved: 2018-06-19

## 2018-06-19 PROBLEM — R53.83 FATIGUE DUE TO SLEEP PATTERN DISTURBANCE: Status: RESOLVED | Noted: 2018-02-13 | Resolved: 2018-06-19

## 2018-06-19 PROCEDURE — 99214 OFFICE O/P EST MOD 30 MIN: CPT | Performed by: FAMILY MEDICINE

## 2018-06-19 RX ORDER — BUDESONIDE AND FORMOTEROL FUMARATE DIHYDRATE 160; 4.5 UG/1; UG/1
2 AEROSOL RESPIRATORY (INHALATION) 2 TIMES DAILY
Qty: 1 INHALER | Refills: 1 | Status: SHIPPED | OUTPATIENT
Start: 2018-06-19 | End: 2019-03-10 | Stop reason: SDUPTHER

## 2018-06-19 RX ORDER — AMLODIPINE AND VALSARTAN 10; 160 MG/1; MG/1
1 TABLET ORAL DAILY
Qty: 90 TABLET | Refills: 1 | Status: SHIPPED | OUTPATIENT
Start: 2018-06-19 | End: 2018-12-10 | Stop reason: SDUPTHER

## 2018-06-19 RX ORDER — ATORVASTATIN CALCIUM 40 MG/1
40 TABLET, FILM COATED ORAL DAILY
Qty: 90 TABLET | Refills: 1 | Status: SHIPPED | OUTPATIENT
Start: 2018-06-19 | End: 2019-01-02 | Stop reason: SDUPTHER

## 2018-06-19 NOTE — ASSESSMENT & PLAN NOTE
He has HDL is low, discussed with him to lose more weight continue exercise and he will continue statin and low dose aspirin, his family history of stroke and Afib  repeat labs and follow-up 6 months

## 2018-06-19 NOTE — ASSESSMENT & PLAN NOTE
Needs to improve his diet further to improve his A1c, and improve his fasting blood sugar  , continue exercise recheck labs in 6 months

## 2018-06-19 NOTE — ASSESSMENT & PLAN NOTE
He use maintenance inhaler Symbicort only 1 puff daily when he gets more wheezing than he use two  puff daily

## 2018-06-19 NOTE — PROGRESS NOTES
Assessment/Plan:    Problem List Items Addressed This Visit        Endocrine    Impaired fasting glucose - Primary     Needs to improve his diet further to improve his A1c, and improve his fasting blood sugar  , continue exercise recheck labs in 6 months         Relevant Orders    CBC and differential    Comprehensive metabolic panel    Hemoglobin A1C       Respiratory    JOSEE (obstructive sleep apnea)     Currently using CPAP machine and feeling improvement         Asthma     He use maintenance inhaler Symbicort only 1 puff daily when he gets more wheezing than he use two  puff daily         Relevant Medications    SYMBICORT 160-4 5 MCG/ACT inhaler    PROAIR  (90 Base) MCG/ACT inhaler       Cardiovascular and Mediastinum    Hypertension     Hypertension stable on amlodipine and valsartan, continue same         Relevant Medications    amLODIPine-valsartan (EXFORGE)  MG per tablet       Other    Hyperlipidemia     He has HDL is low, discussed with him to lose more weight continue exercise and he will continue statin and low dose aspirin, his family history of stroke and Afib  repeat labs and follow-up 6 months         Relevant Medications    atorvastatin (LIPITOR) 40 mg tablet    Other Relevant Orders    Lipid panel    Obesity (BMI 30-39  9)     He is trying to lose weight, advised to continue exercise and low-fat low carb diet so that he can lose more weight, he needs to improve his HDL         Need for prophylactic vaccination with tetanus-diphtheria (Td)     Td booster given         Relevant Orders    TD VACCINE GREATER THAN OR EQUAL TO 8YO PRESERVATIVE FREE IM          Chief Complaint   Patient presents with    Follow-up       Subjective:   Patient ID: Nathaniel Middleton is a 36 y o  male  He also has sleep apnea and currently using CPAP machine  And he feels much better his fatigue during daytime has improved  And he is getting used to the machine  , he is ex-smoker    And he does regular exercise  Asthma is under control he use Symbicort only 1 puff daily as a maintenance  He is up-to-date on pneumonia vaccine and he is due for his tetanus vaccine  Hypertension   This is a chronic problem  The problem is unchanged  The problem is controlled  Pertinent negatives include no anxiety, blurred vision, chest pain, headaches, malaise/fatigue, neck pain, orthopnea, palpitations, peripheral edema, PND, shortness of breath or sweats  There are no associated agents to hypertension  Risk factors for coronary artery disease include dyslipidemia, obesity and male gender  Past treatments include calcium channel blockers and angiotensin blockers  The current treatment provides significant improvement  There are no compliance problems  There is no history of angina, kidney disease, CAD/MI, CVA, heart failure, PVD or retinopathy  Identifiable causes of hypertension include sleep apnea  There is no history of chronic renal disease  Hyperlipidemia   This is a chronic problem  Recent lipid tests were reviewed and are variable  Exacerbating diseases include obesity  He has no history of chronic renal disease or diabetes  Impaired fasting glucose  Pertinent negatives include no chest pain, myalgias or shortness of breath  Current antihyperlipidemic treatment includes exercise, diet change and statins  The current treatment provides moderate improvement of lipids  Compliance problems include adherence to diet and adherence to exercise  Review of Systems   Constitutional: Negative for activity change, appetite change, chills, diaphoresis, fatigue, fever, malaise/fatigue and unexpected weight change  HENT: Negative for congestion, dental problem, ear discharge, ear pain, facial swelling, hearing loss, mouth sores, nosebleeds, postnasal drip, rhinorrhea, sinus pain, sinus pressure, sneezing, sore throat, trouble swallowing and voice change      Eyes: Negative for blurred vision, photophobia, pain, discharge, redness and itching  Respiratory: Negative for cough, chest tightness, shortness of breath and wheezing  Cardiovascular: Negative for chest pain, palpitations, orthopnea, leg swelling and PND  Gastrointestinal: Negative for abdominal distention, abdominal pain, blood in stool, constipation, diarrhea and nausea  Endocrine: Negative for cold intolerance, heat intolerance, polydipsia, polyphagia and polyuria  Genitourinary: Negative for dysuria, flank pain, frequency, hematuria and urgency  Musculoskeletal: Negative for arthralgias, back pain, myalgias and neck pain  Skin: Negative for color change and pallor  Allergic/Immunologic: Negative for environmental allergies and food allergies  Neurological: Negative for dizziness, weakness, light-headedness, numbness and headaches  Hematological: Negative for adenopathy  Does not bruise/bleed easily  Psychiatric/Behavioral: Negative for behavioral problems, sleep disturbance and suicidal ideas  The patient is not nervous/anxious  Objective:  Physical Exam   Constitutional: He is oriented to person, place, and time  He appears well-developed and well-nourished  HENT:   Head: Normocephalic and atraumatic  Nose: Nose normal    Mouth/Throat: Oropharynx is clear and moist  No oropharyngeal exudate  Eyes: Conjunctivae and EOM are normal  Pupils are equal, round, and reactive to light  Right eye exhibits no discharge  Left eye exhibits no discharge  No scleral icterus  Neck: Normal range of motion  Neck supple  No tracheal deviation present  No thyromegaly present  Cardiovascular: Normal rate, regular rhythm and normal heart sounds  No murmur heard  Pulmonary/Chest: Effort normal and breath sounds normal  No respiratory distress  He has no wheezes  He has no rales  Abdominal: Soft  Bowel sounds are normal  He exhibits no distension and no mass  There is no tenderness  There is no rebound  Musculoskeletal: Normal range of motion   He exhibits no edema  Lymphadenopathy:     He has no cervical adenopathy  Neurological: He is alert and oriented to person, place, and time  He has normal reflexes  No cranial nerve deficit  Skin: Skin is warm  No rash noted  No erythema  No pallor  Psychiatric: He has a normal mood and affect  His behavior is normal  Judgment and thought content normal    Nursing note and vitals reviewed           Past Surgical History:   Procedure Laterality Date    HERNIA REPAIR         Family History   Problem Relation Age of Onset    Sleep apnea Father         Adult    Atrial fibrillation Father     Stroke Father          Current Outpatient Prescriptions:     amLODIPine-valsartan (EXFORGE)  MG per tablet, Take 1 tablet by mouth daily, Disp: 90 tablet, Rfl: 1    aspirin 81 MG tablet, Take 1 tablet by mouth daily, Disp: , Rfl:     atorvastatin (LIPITOR) 40 mg tablet, Take 1 tablet (40 mg total) by mouth daily, Disp: 90 tablet, Rfl: 1    PROAIR  (90 Base) MCG/ACT inhaler, Inhale 2 puffs every 6 (six) hours as needed for wheezing, Disp: 1 Inhaler, Rfl: 0    SYMBICORT 160-4 5 MCG/ACT inhaler, Inhale 2 puffs 2 (two) times a day, Disp: 1 Inhaler, Rfl: 1    Allergies   Allergen Reactions    Fish Oil     Oxycodone-Acetaminophen      Reaction Date: 12Jun2012;        Vitals:    06/19/18 1656   BP: 136/72   BP Location: Right arm   Patient Position: Sitting   Cuff Size: Standard   Pulse: 73   Resp: 16   SpO2: 98%   Weight: 103 kg (226 lb)   Height: 5' 11" (1 803 m)

## 2018-06-19 NOTE — ASSESSMENT & PLAN NOTE
He is trying to lose weight, advised to continue exercise and low-fat low carb diet so that he can lose more weight, he needs to improve his HDL

## 2018-06-20 PROCEDURE — 90471 IMMUNIZATION ADMIN: CPT

## 2018-06-20 PROCEDURE — 90714 TD VACC NO PRESV 7 YRS+ IM: CPT

## 2018-08-06 ENCOUNTER — OFFICE VISIT (OUTPATIENT)
Dept: SLEEP CENTER | Facility: CLINIC | Age: 43
End: 2018-08-06
Payer: COMMERCIAL

## 2018-08-06 VITALS
SYSTOLIC BLOOD PRESSURE: 102 MMHG | WEIGHT: 225.6 LBS | HEART RATE: 68 BPM | DIASTOLIC BLOOD PRESSURE: 70 MMHG | HEIGHT: 71 IN | BODY MASS INDEX: 31.58 KG/M2

## 2018-08-06 DIAGNOSIS — E66.9 OBESITY (BMI 30-39.9): ICD-10-CM

## 2018-08-06 DIAGNOSIS — G47.10 HYPERSOMNIA: ICD-10-CM

## 2018-08-06 DIAGNOSIS — J45.20 MILD INTERMITTENT ASTHMA WITHOUT COMPLICATION: ICD-10-CM

## 2018-08-06 DIAGNOSIS — G47.33 OSA (OBSTRUCTIVE SLEEP APNEA): Primary | ICD-10-CM

## 2018-08-06 DIAGNOSIS — I10 ESSENTIAL HYPERTENSION: ICD-10-CM

## 2018-08-06 PROCEDURE — 99214 OFFICE O/P EST MOD 30 MIN: CPT | Performed by: INTERNAL MEDICINE

## 2018-08-06 NOTE — PROGRESS NOTES
Review of Systems      Genitourinary none   Cardiology none   Gastrointestinal none   Neurology none   Constitutional none   Integumentary none   Psychiatry anxiety   Musculoskeletal none   Pulmonary snoring   ENT none   Endocrine none   Hematological none

## 2018-08-06 NOTE — PROGRESS NOTES
Follow-Up Note - [address]  36 y o  male  :1975  [id number]    CC: I saw this patient for follow-up in clinic today for his Sleep Disordered Breathing, Coexisting Sleep and Medical Problems  PFSH, Problem List, Medications & Allergies were reviewed in EMR  Interval changes: [none reported ]   He  has a past medical history of Benign essential hypertension (2006); Cellulitis of ankle; Hyperlipidemia (2006); and Sting from hornet, wasp, or bee  He has a current medication list which includes the following prescription(s): amlodipine-valsartan, aspirin, atorvastatin, proair hfa, and symbicort  ROS: Reviewed (see attached)  HPI:  With respect to compliance using PAP, data download shows:  using PAP > 4 hours per night 97% of the time  RL (estimated) 0 4/hour at [90th percentile] pressure of 16 9cm H2O   Dwight Maria reports  · no  difficulty tolerating PAP;   · some adverse effects: dry mouth  · Benefitting from use: sleeping better and no longer snoring / having breathing difficulties     Sleep Routine:  He reports getting 7-8 hr sleep; he has no difficulty initiating or maintaining sleep   He awakens with the aid of an alarm feeling refreshed  He denies excessive drowsiness [ ]  [facility] rated himself at Total score: 2 /24 on the [place] sleepiness scale ]  [  ]  Habits:[ reports that he has quit smoking  He has never used smokeless tobacco ], [ reports that he drinks alcohol ], [ reports that he does not use drugs  ], Caffeine use: limited[ ], Exercise routine: regular [ ]  EXAM: /70   Pulse 68   Ht 5' 11" (1 803 m)   Wt 102 kg (225 lb 9 6 oz)   BMI 31 46 kg/m²      Patient is alert, orientated, cooperative [and in no distress]  Mental state [appears normal]  Craniofacial anatomy normal  There are [no] facial pressure marks or rashes  Neck Circumference: 40 cm  There are no abnormal neck masses   Nasal airway is [patent ]  Mucous membranes appeared normal  The oral airway [is crowded ] [Apart from truncal obesity,] the rest of exam (Heart, Lungs, Abdomen, CNS and Musculoskeletal systems) was unremarkable   IMPRESSION:     1  JOSEE (obstructive sleep apnea)     2  Hypersomnia     3  Essential hypertension     4  Mild intermittent asthma without complication     5  Obesity (BMI 30-39  9)         PLAN:  1  Treatment with  PAP is medically necessary and Radha Gonzalez is agreable to continue use  2  Instruction on care of equipment and strategies to improve comfort with use of PAP were discussed  3  Care coordinated with DME provider and Rx to replace supplies provided  4  Pressure setting:  Continue at 16-20 cm H2O     5  Compliance with therapy was emphasized and strategies for weight reduction discussed  6  With your consent, follow-up is advised in [1 Gabe Simmonds [or sooner if needed] [to monitor progress, compliance and to adjust therapy]  Thank you for allowing me to participate in the care of this patient      Sincerely,    Authenticated electronically by Sommer Mchugh MD on 39/99/46   Board Certified Specialist

## 2018-08-27 ENCOUNTER — OFFICE VISIT (OUTPATIENT)
Dept: FAMILY MEDICINE CLINIC | Facility: CLINIC | Age: 43
End: 2018-08-27
Payer: COMMERCIAL

## 2018-08-27 VITALS
SYSTOLIC BLOOD PRESSURE: 134 MMHG | HEIGHT: 71 IN | RESPIRATION RATE: 16 BRPM | BODY MASS INDEX: 30.52 KG/M2 | HEART RATE: 91 BPM | WEIGHT: 218 LBS | OXYGEN SATURATION: 97 % | DIASTOLIC BLOOD PRESSURE: 70 MMHG

## 2018-08-27 DIAGNOSIS — J45.20 MILD INTERMITTENT ASTHMA WITHOUT COMPLICATION: ICD-10-CM

## 2018-08-27 DIAGNOSIS — F41.9 ANXIETY: Primary | ICD-10-CM

## 2018-08-27 DIAGNOSIS — I10 ESSENTIAL HYPERTENSION: ICD-10-CM

## 2018-08-27 DIAGNOSIS — G47.33 OSA (OBSTRUCTIVE SLEEP APNEA): ICD-10-CM

## 2018-08-27 PROCEDURE — 3075F SYST BP GE 130 - 139MM HG: CPT | Performed by: FAMILY MEDICINE

## 2018-08-27 PROCEDURE — 99214 OFFICE O/P EST MOD 30 MIN: CPT | Performed by: FAMILY MEDICINE

## 2018-08-27 PROCEDURE — 3078F DIAST BP <80 MM HG: CPT | Performed by: FAMILY MEDICINE

## 2018-08-27 RX ORDER — BUSPIRONE HYDROCHLORIDE 7.5 MG/1
TABLET ORAL
Qty: 60 TABLET | Refills: 0 | Status: SHIPPED | OUTPATIENT
Start: 2018-08-27 | End: 2018-09-11 | Stop reason: DRUGHIGH

## 2018-08-27 NOTE — PROGRESS NOTES
Assessment/Plan:    Problem List Items Addressed This Visit        Respiratory    JOSEE (obstructive sleep apnea)     He is on CPAP machine  Asthma     Stable            Cardiovascular and Mediastinum    Essential hypertension     Hypertension is stable he will continue same medication amlodipine valsartan            Other    Anxiety - Primary     Anxiety with symptoms of panic, discussed with him that most of the ssRI and SNRI have sexual side effects,  Discussed about Wellbutrin which might not be a good idea for anxiety, will try BuSpar 7 5 mg twice a day  Under advised to follow up in 2 weeks, if it does not feel well or any side effects he should call the doctor         Relevant Medications    busPIRone (BUSPAR) 7 5 mg tablet          Chief Complaint   Patient presents with    Follow-up    Anxiety       Subjective:   Patient ID: Renetta Recio is a 36 y o  male  He says he has very high anxiety level, he bites his nails, he feels tightness in the chest, he feels anxious this has been going on for many weeks previously he was on Cymbalta and he stopped about 6 months ago, he says that medicine had sexual side effects and he does not like to take any medicine which can affect his life,  He has been diagnosed sleep apnea and using CPAP machine,  He denies any suicide rheumatoid all thought and he does not have any depression he does not have any triggering event  He has hypertension and asthma which has been stable on medication        Review of Systems   Constitutional: Negative for activity change, appetite change, chills, diaphoresis, fatigue, fever and unexpected weight change  HENT: Negative for congestion, dental problem, ear discharge, ear pain, facial swelling, hearing loss, mouth sores, nosebleeds, postnasal drip, rhinorrhea, sinus pain, sinus pressure, sneezing, sore throat, trouble swallowing and voice change  Eyes: Negative for photophobia, pain, discharge, redness and itching  Respiratory: Negative for cough, chest tightness, shortness of breath and wheezing  Cardiovascular: Negative for chest pain, palpitations and leg swelling  Gastrointestinal: Negative for abdominal distention, abdominal pain, blood in stool, constipation, diarrhea and nausea  Endocrine: Negative for cold intolerance, heat intolerance, polydipsia, polyphagia and polyuria  Genitourinary: Negative for dysuria, flank pain, frequency, hematuria and urgency  Musculoskeletal: Negative for arthralgias, back pain, myalgias and neck pain  Skin: Negative for color change and pallor  Allergic/Immunologic: Negative for environmental allergies and food allergies  Neurological: Negative for dizziness, weakness, light-headedness, numbness and headaches  Hematological: Negative for adenopathy  Does not bruise/bleed easily  Psychiatric/Behavioral: Negative for behavioral problems, sleep disturbance and suicidal ideas  The patient is not nervous/anxious  Objective:  Physical Exam   Constitutional: He is oriented to person, place, and time  He appears well-developed and well-nourished  HENT:   Head: Normocephalic and atraumatic  Nose: Nose normal    Mouth/Throat: Oropharynx is clear and moist  No oropharyngeal exudate  Eyes: Conjunctivae and EOM are normal  Pupils are equal, round, and reactive to light  Right eye exhibits no discharge  Left eye exhibits no discharge  No scleral icterus  Neck: Normal range of motion  Neck supple  No tracheal deviation present  No thyromegaly present  Cardiovascular: Normal rate, regular rhythm and normal heart sounds  No murmur heard  Pulmonary/Chest: Effort normal and breath sounds normal  No respiratory distress  He has no wheezes  He has no rales  Abdominal: Soft  Bowel sounds are normal  He exhibits no distension and no mass  There is no tenderness  There is no rebound  Musculoskeletal: Normal range of motion  He exhibits no edema     Lymphadenopathy: He has no cervical adenopathy  Neurological: He is alert and oriented to person, place, and time  He has normal reflexes  No cranial nerve deficit  Skin: Skin is warm  No rash noted  No erythema  No pallor  Psychiatric: He has a normal mood and affect  His behavior is normal  Judgment and thought content normal    Nursing note and vitals reviewed           Past Surgical History:   Procedure Laterality Date    HERNIA REPAIR         Family History   Problem Relation Age of Onset    Sleep apnea Father         Adult    Atrial fibrillation Father     Stroke Father          Current Outpatient Prescriptions:     amLODIPine-valsartan (EXFORGE)  MG per tablet, Take 1 tablet by mouth daily, Disp: 90 tablet, Rfl: 1    aspirin 81 MG tablet, Take 1 tablet by mouth daily, Disp: , Rfl:     atorvastatin (LIPITOR) 40 mg tablet, Take 1 tablet (40 mg total) by mouth daily, Disp: 90 tablet, Rfl: 1    PROAIR  (90 Base) MCG/ACT inhaler, Inhale 2 puffs every 6 (six) hours as needed for wheezing, Disp: 1 Inhaler, Rfl: 0    SYMBICORT 160-4 5 MCG/ACT inhaler, Inhale 2 puffs 2 (two) times a day, Disp: 1 Inhaler, Rfl: 1    busPIRone (BUSPAR) 7 5 mg tablet, 1 tab twice a day, Disp: 60 tablet, Rfl: 0    Allergies   Allergen Reactions    Fish Oil     Oxycodone-Acetaminophen      Reaction Date: 12Jun2012;        Vitals:    08/27/18 1447   BP: 134/70   Pulse: 91   Resp: 16   SpO2: 97%   Weight: 98 9 kg (218 lb)   Height: 5' 11" (1 803 m)

## 2018-08-27 NOTE — ASSESSMENT & PLAN NOTE
Anxiety with symptoms of panic, discussed with him that most of the ssRI and SNRI have sexual side effects,  Discussed about Wellbutrin which might not be a good idea for anxiety, will try BuSpar 7 5 mg twice a day    Under advised to follow up in 2 weeks, if it does not feel well or any side effects he should call the doctor

## 2018-09-11 ENCOUNTER — OFFICE VISIT (OUTPATIENT)
Dept: FAMILY MEDICINE CLINIC | Facility: CLINIC | Age: 43
End: 2018-09-11
Payer: COMMERCIAL

## 2018-09-11 VITALS
RESPIRATION RATE: 16 BRPM | SYSTOLIC BLOOD PRESSURE: 122 MMHG | HEIGHT: 71 IN | WEIGHT: 207 LBS | DIASTOLIC BLOOD PRESSURE: 76 MMHG | HEART RATE: 80 BPM | OXYGEN SATURATION: 98 % | BODY MASS INDEX: 28.98 KG/M2

## 2018-09-11 DIAGNOSIS — F41.9 ANXIETY: Primary | ICD-10-CM

## 2018-09-11 PROCEDURE — 99213 OFFICE O/P EST LOW 20 MIN: CPT | Performed by: FAMILY MEDICINE

## 2018-09-11 PROCEDURE — 3008F BODY MASS INDEX DOCD: CPT | Performed by: FAMILY MEDICINE

## 2018-09-11 RX ORDER — BUSPIRONE HYDROCHLORIDE 15 MG/1
15 TABLET ORAL 2 TIMES DAILY
Qty: 1 TABLET | Refills: 0
Start: 2018-09-11 | End: 2018-09-19 | Stop reason: ALTCHOICE

## 2018-09-11 NOTE — ASSESSMENT & PLAN NOTE
Anxiety not well controlled with BuSpar 7 5 mg twice a day, advised to increase the dose 15 mg twice a day, and he will call me in few days to see if is working if not done I discussed with him we can try Wellbutrin which might not be very effective but that can be another option, or we can start him on Lexapro

## 2018-09-11 NOTE — PROGRESS NOTES
Assessment/Plan:    Problem List Items Addressed This Visit        Other    Anxiety - Primary      Anxiety not well controlled with BuSpar 7 5 mg twice a day, advised to increase the dose 15 mg twice a day, and he will call me in few days to see if is working if not done I discussed with him we can try Wellbutrin which might not be very effective but that can be another option, or we can start him on Lexapro         Relevant Medications    busPIRone (BUSPAR) 15 mg tablet          Chief Complaint   Patient presents with    Follow-up    Anxiety       Subjective:   Patient ID: Arina Ledezma is a 36 y o  male  He is here follow-up on his anxiety and panic which is not improving with the buspar 7 5 mg twice a day, previously he was on Cymbalta and in the past lorazepam which he use for few years then it was stopped,  Cymbalta was working but he had sexual side effects so he weaned off    he says every day he has the same feeling off not relax anxious, denies any palpitation heart racing or chest pain or, he has no new side effects with this medication        Review of Systems   Constitutional: Negative for activity change, appetite change, chills, diaphoresis, fatigue, fever and unexpected weight change  HENT: Negative for congestion, dental problem, ear discharge, ear pain, facial swelling, hearing loss, mouth sores, nosebleeds, postnasal drip, rhinorrhea, sinus pain, sinus pressure, sneezing, sore throat, trouble swallowing and voice change  Eyes: Negative for photophobia, pain, discharge, redness and itching  Respiratory: Negative for cough, chest tightness, shortness of breath and wheezing  Cardiovascular: Negative for chest pain, palpitations and leg swelling  Gastrointestinal: Negative for abdominal distention, abdominal pain, blood in stool, constipation, diarrhea and nausea  Endocrine: Negative for cold intolerance, heat intolerance, polydipsia, polyphagia and polyuria     Genitourinary: Negative for dysuria, flank pain, frequency, hematuria and urgency  Musculoskeletal: Negative for arthralgias, back pain, myalgias and neck pain  Skin: Negative for color change and pallor  Allergic/Immunologic: Negative for environmental allergies and food allergies  Neurological: Negative for dizziness, weakness, light-headedness, numbness and headaches  Hematological: Negative for adenopathy  Does not bruise/bleed easily  Psychiatric/Behavioral: Negative for behavioral problems, sleep disturbance and suicidal ideas  The patient is not nervous/anxious  Objective:  Physical Exam   Constitutional: He is oriented to person, place, and time  He appears well-developed and well-nourished  No distress  HENT:   Head: Normocephalic and atraumatic  Eyes: EOM are normal    Neck: Normal range of motion  Cardiovascular: Normal rate, regular rhythm and normal heart sounds  Pulmonary/Chest: Effort normal and breath sounds normal  No respiratory distress  He has no wheezes  Neurological: He is alert and oriented to person, place, and time  Psychiatric: He has a normal mood and affect   His behavior is normal  Judgment and thought content normal           Past Surgical History:   Procedure Laterality Date    HERNIA REPAIR         Family History   Problem Relation Age of Onset    Sleep apnea Father         Adult    Atrial fibrillation Father     Stroke Father          Current Outpatient Prescriptions:     amLODIPine-valsartan (EXFORGE)  MG per tablet, Take 1 tablet by mouth daily, Disp: 90 tablet, Rfl: 1    aspirin 81 MG tablet, Take 1 tablet by mouth daily, Disp: , Rfl:     atorvastatin (LIPITOR) 40 mg tablet, Take 1 tablet (40 mg total) by mouth daily, Disp: 90 tablet, Rfl: 1    PROAIR  (90 Base) MCG/ACT inhaler, Inhale 2 puffs every 6 (six) hours as needed for wheezing, Disp: 1 Inhaler, Rfl: 0    SYMBICORT 160-4 5 MCG/ACT inhaler, Inhale 2 puffs 2 (two) times a day, Disp: 1 Inhaler, Rfl: 1    busPIRone (BUSPAR) 15 mg tablet, Take 1 tablet (15 mg total) by mouth 2 (two) times a day, Disp: 1 tablet, Rfl: 0    Allergies   Allergen Reactions    Fish Oil     Oxycodone-Acetaminophen      Reaction Date: 12Jun2012;        Vitals:    09/11/18 0758 09/11/18 0819   BP: 140/70 122/76   Pulse: 80    Resp: 16    SpO2: 98%    Weight: 93 9 kg (207 lb)    Height: 5' 11" (1 803 m)

## 2018-09-17 ENCOUNTER — TELEPHONE (OUTPATIENT)
Dept: FAMILY MEDICINE CLINIC | Facility: CLINIC | Age: 43
End: 2018-09-17

## 2018-09-17 NOTE — TELEPHONE ENCOUNTER
Chaparro Casper saw Dr Laurie Castillo on 9/11  She increased his buspar to 15mg 2x a day  Was asked to call her to let her know if this was helping  Chaparro Casper states he sees no improvement with the increase

## 2018-09-19 DIAGNOSIS — F41.9 ANXIETY: Primary | ICD-10-CM

## 2018-09-19 RX ORDER — BUPROPION HYDROCHLORIDE 150 MG/1
TABLET ORAL
Qty: 56 TABLET | Refills: 0 | Status: SHIPPED | OUTPATIENT
Start: 2018-09-19 | End: 2018-10-09

## 2018-09-19 NOTE — TELEPHONE ENCOUNTER
I spoke with the patient and changed  medication, he is on Wellbutrin now and will follow up in few weeks

## 2018-09-19 NOTE — PROGRESS NOTES
Discussed with the patient BuSpar not working, advised to stop BuSpar, and I will start him on Wellbutrin, he will take 1 tablet for 4 days then increase to tablet daily and follow-up in 2-3 weeks    Advised to stop medicine if he has any side effects or not feeling well

## 2018-09-19 NOTE — TELEPHONE ENCOUNTER
Pt called back he is out of pills  It looks like it was sent on 9/11? With dispensing only 1 pill   Can you please fix this

## 2018-10-09 ENCOUNTER — OFFICE VISIT (OUTPATIENT)
Dept: FAMILY MEDICINE CLINIC | Facility: CLINIC | Age: 43
End: 2018-10-09
Payer: COMMERCIAL

## 2018-10-09 VITALS
SYSTOLIC BLOOD PRESSURE: 126 MMHG | HEIGHT: 71 IN | HEART RATE: 82 BPM | WEIGHT: 213 LBS | RESPIRATION RATE: 16 BRPM | DIASTOLIC BLOOD PRESSURE: 70 MMHG | OXYGEN SATURATION: 98 % | BODY MASS INDEX: 29.82 KG/M2

## 2018-10-09 DIAGNOSIS — F41.9 ANXIETY: Primary | ICD-10-CM

## 2018-10-09 PROCEDURE — 99213 OFFICE O/P EST LOW 20 MIN: CPT | Performed by: FAMILY MEDICINE

## 2018-10-09 RX ORDER — BUPROPION HYDROCHLORIDE 300 MG/1
300 TABLET ORAL DAILY
Qty: 30 TABLET | Refills: 5 | Status: SHIPPED | OUTPATIENT
Start: 2018-10-09 | End: 2019-04-02 | Stop reason: SDUPTHER

## 2018-10-09 NOTE — PROGRESS NOTES
Assessment/Plan:    Problem List Items Addressed This Visit        Other    Anxiety - Primary     Anxiety is better controlled with Wellbutrin, continue same 300 mg Wellbutrin and will follow up in few months after his  Labs          Relevant Medications    buPROPion (WELLBUTRIN XL) 300 mg 24 hr tablet          Chief Complaint   Patient presents with    Follow-up       Subjective:   Patient ID: Cheyenne Dowd is a 39 y o  male  He is here follow-up on anxiety, since he has started on Wellbutrin for last 3 weeks he says his anxiety is better controlled, in the past BuSpar did not help his anxiety,  And also he has been given Cymbalta in the past which he did not like  Recently has no weight gain ,he thinks the previous weight which was written in the system was not the right weight  Review of Systems   Constitutional: Negative for activity change, appetite change, chills, diaphoresis, fatigue, fever and unexpected weight change  HENT: Negative for congestion, dental problem, ear discharge, ear pain, facial swelling, hearing loss, mouth sores, nosebleeds, postnasal drip, rhinorrhea, sinus pain, sinus pressure, sneezing, sore throat, trouble swallowing and voice change  Eyes: Negative for photophobia, pain, discharge, redness and itching  Respiratory: Negative for cough, chest tightness, shortness of breath and wheezing  Cardiovascular: Negative for chest pain, palpitations and leg swelling  Gastrointestinal: Negative for abdominal distention, abdominal pain, blood in stool, constipation, diarrhea and nausea  Endocrine: Negative for cold intolerance, heat intolerance, polydipsia, polyphagia and polyuria  Genitourinary: Negative for dysuria, flank pain, frequency, hematuria and urgency  Musculoskeletal: Negative for arthralgias, back pain, myalgias and neck pain  Skin: Negative for color change and pallor     Allergic/Immunologic: Negative for environmental allergies and food allergies  Neurological: Negative for dizziness, weakness, light-headedness, numbness and headaches  Hematological: Negative for adenopathy  Does not bruise/bleed easily  Psychiatric/Behavioral: Negative for behavioral problems, sleep disturbance and suicidal ideas  The patient is not nervous/anxious  Objective:  Physical Exam   Constitutional: He is oriented to person, place, and time  He appears well-developed and well-nourished  HENT:   Head: Normocephalic and atraumatic  Nose: Nose normal    Mouth/Throat: Oropharynx is clear and moist  No oropharyngeal exudate  Eyes: Pupils are equal, round, and reactive to light  Conjunctivae and EOM are normal  Right eye exhibits no discharge  Left eye exhibits no discharge  No scleral icterus  Neck: Normal range of motion  Neck supple  No tracheal deviation present  No thyromegaly present  Cardiovascular: Normal rate, regular rhythm and normal heart sounds  No murmur heard  Pulmonary/Chest: Effort normal and breath sounds normal  No respiratory distress  He has no wheezes  He has no rales  Abdominal: Soft  Bowel sounds are normal  He exhibits no distension and no mass  There is no tenderness  There is no rebound  Musculoskeletal: Normal range of motion  He exhibits no edema  Lymphadenopathy:     He has no cervical adenopathy  Neurological: He is alert and oriented to person, place, and time  He has normal reflexes  No cranial nerve deficit  Skin: Skin is warm  No rash noted  No erythema  No pallor  Psychiatric: He has a normal mood and affect  His behavior is normal  Judgment and thought content normal    Nursing note and vitals reviewed           Past Surgical History:   Procedure Laterality Date    HERNIA REPAIR         Family History   Problem Relation Age of Onset    Sleep apnea Father         Adult    Atrial fibrillation Father     Stroke Father          Current Outpatient Prescriptions:     amLODIPine-valsartan [facility])  MG per tablet, Take 1 tablet by mouth daily, Disp: 90 tablet, Rfl: 1    aspirin 81 MG tablet, Take 1 tablet by mouth daily, Disp: , Rfl:     atorvastatin (LIPITOR) 40 mg tablet, Take 1 tablet (40 mg total) by mouth daily, Disp: 90 tablet, Rfl: 1    PROAIR  (90 Base) MCG/ACT inhaler, Inhale 2 puffs every 6 (six) hours as needed for wheezing, Disp: 1 Inhaler, Rfl: 0    SYMBICORT 160-4 5 MCG/ACT inhaler, Inhale 2 puffs 2 (two) times a day, Disp: 1 Inhaler, Rfl: 1    buPROPion (WELLBUTRIN XL) 300 mg 24 hr tablet, Take 1 tablet (300 mg total) by mouth daily, Disp: 30 tablet, Rfl: 5    Allergies   Allergen Reactions    Fish Oil     Oxycodone-Acetaminophen      Reaction Date: 12Jun2012;        Vitals:    10/09/18 1901   BP: 126/70   Pulse: 82   Resp: 16   SpO2: 98%   Weight: 96 6 kg (213 lb)   Height: 5' 11" (1 803 m)

## 2018-10-10 NOTE — ASSESSMENT & PLAN NOTE
Anxiety is better controlled with Wellbutrin, continue same 300 mg Wellbutrin and will follow up in few months after his  Labs

## 2018-12-10 DIAGNOSIS — I10 ESSENTIAL HYPERTENSION: ICD-10-CM

## 2018-12-10 RX ORDER — AMLODIPINE AND VALSARTAN 10; 160 MG/1; MG/1
TABLET ORAL
Qty: 90 TABLET | Refills: 1 | Status: SHIPPED | OUTPATIENT
Start: 2018-12-10 | End: 2019-07-18 | Stop reason: SDUPTHER

## 2018-12-13 ENCOUNTER — TELEPHONE (OUTPATIENT)
Dept: FAMILY MEDICINE CLINIC | Facility: CLINIC | Age: 43
End: 2018-12-13

## 2018-12-13 NOTE — TELEPHONE ENCOUNTER
Left a message for patient to call back regarding the recall notice for amlodipine valsartan, letter sent through [facility],  This says only recall for mylan  Laboratories

## 2019-01-02 DIAGNOSIS — E78.2 MIXED HYPERLIPIDEMIA: ICD-10-CM

## 2019-01-02 RX ORDER — ATORVASTATIN CALCIUM 40 MG/1
TABLET, FILM COATED ORAL
Qty: 90 TABLET | Refills: 0 | Status: SHIPPED | OUTPATIENT
Start: 2019-01-02 | End: 2019-04-22 | Stop reason: SDUPTHER

## 2019-01-02 NOTE — TELEPHONE ENCOUNTER
Patient last seen in October and instructed to return in January    Patient has an upcoming appointment with Dr Zachariah Enriquez

## 2019-01-11 LAB
ALBUMIN SERPL-MCNC: 4.6 G/DL (ref 3.6–5.1)
ALBUMIN/GLOB SERPL: 1.8 (CALC) (ref 1–2.5)
ALP SERPL-CCNC: 47 U/L (ref 40–115)
ALT SERPL-CCNC: 15 U/L (ref 9–46)
AST SERPL-CCNC: 10 U/L (ref 10–40)
BASOPHILS # BLD AUTO: 20 CELLS/UL (ref 0–200)
BASOPHILS NFR BLD AUTO: 0.3 %
BILIRUB SERPL-MCNC: 0.5 MG/DL (ref 0.2–1.2)
BUN SERPL-MCNC: 20 MG/DL (ref 7–25)
BUN/CREAT SERPL: ABNORMAL (CALC) (ref 6–22)
CALCIUM SERPL-MCNC: 9.3 MG/DL (ref 8.6–10.3)
CHLORIDE SERPL-SCNC: 108 MMOL/L (ref 98–110)
CHOLEST SERPL-MCNC: 157 MG/DL
CHOLEST/HDLC SERPL: 4.4 (CALC)
CO2 SERPL-SCNC: 29 MMOL/L (ref 20–32)
CREAT SERPL-MCNC: 0.9 MG/DL (ref 0.6–1.35)
EOSINOPHIL # BLD AUTO: 198 CELLS/UL (ref 15–500)
EOSINOPHIL NFR BLD AUTO: 3 %
ERYTHROCYTE [DISTWIDTH] IN BLOOD BY AUTOMATED COUNT: 12.4 % (ref 11–15)
GLOBULIN SER CALC-MCNC: 2.5 G/DL (CALC) (ref 1.9–3.7)
GLUCOSE SERPL-MCNC: 105 MG/DL (ref 65–99)
HBA1C MFR BLD: 5.5 % OF TOTAL HGB
HCT VFR BLD AUTO: 44.6 % (ref 38.5–50)
HDLC SERPL-MCNC: 36 MG/DL
HGB BLD-MCNC: 15.3 G/DL (ref 13.2–17.1)
LDLC SERPL CALC-MCNC: 102 MG/DL (CALC)
LYMPHOCYTES # BLD AUTO: 1683 CELLS/UL (ref 850–3900)
LYMPHOCYTES NFR BLD AUTO: 25.5 %
MCH RBC QN AUTO: 32.1 PG (ref 27–33)
MCHC RBC AUTO-ENTMCNC: 34.3 G/DL (ref 32–36)
MCV RBC AUTO: 93.5 FL (ref 80–100)
MONOCYTES # BLD AUTO: 574 CELLS/UL (ref 200–950)
MONOCYTES NFR BLD AUTO: 8.7 %
NEUTROPHILS # BLD AUTO: 4125 CELLS/UL (ref 1500–7800)
NEUTROPHILS NFR BLD AUTO: 62.5 %
NONHDLC SERPL-MCNC: 121 MG/DL (CALC)
PLATELET # BLD AUTO: 216 THOUSAND/UL (ref 140–400)
PMV BLD REES-ECKER: 10.7 FL (ref 7.5–12.5)
POTASSIUM SERPL-SCNC: 4.4 MMOL/L (ref 3.5–5.3)
PROT SERPL-MCNC: 7.1 G/DL (ref 6.1–8.1)
RBC # BLD AUTO: 4.77 MILLION/UL (ref 4.2–5.8)
SL AMB EGFR AFRICAN AMERICAN: 120 ML/MIN/1.73M2
SL AMB EGFR NON AFRICAN AMERICAN: 104 ML/MIN/1.73M2
SODIUM SERPL-SCNC: 142 MMOL/L (ref 135–146)
TRIGL SERPL-MCNC: 101 MG/DL
WBC # BLD AUTO: 6.6 THOUSAND/UL (ref 3.8–10.8)

## 2019-01-15 ENCOUNTER — OFFICE VISIT (OUTPATIENT)
Dept: FAMILY MEDICINE CLINIC | Facility: CLINIC | Age: 44
End: 2019-01-15
Payer: COMMERCIAL

## 2019-01-15 VITALS
HEIGHT: 71 IN | RESPIRATION RATE: 16 BRPM | SYSTOLIC BLOOD PRESSURE: 120 MMHG | WEIGHT: 207 LBS | HEART RATE: 92 BPM | DIASTOLIC BLOOD PRESSURE: 68 MMHG | BODY MASS INDEX: 28.98 KG/M2 | OXYGEN SATURATION: 91 %

## 2019-01-15 DIAGNOSIS — E78.2 MIXED HYPERLIPIDEMIA: ICD-10-CM

## 2019-01-15 DIAGNOSIS — J45.20 MILD INTERMITTENT ASTHMA WITHOUT COMPLICATION: Primary | ICD-10-CM

## 2019-01-15 DIAGNOSIS — I10 ESSENTIAL HYPERTENSION: ICD-10-CM

## 2019-01-15 DIAGNOSIS — F41.9 ANXIETY: ICD-10-CM

## 2019-01-15 PROCEDURE — 3008F BODY MASS INDEX DOCD: CPT | Performed by: FAMILY MEDICINE

## 2019-01-15 PROCEDURE — 3074F SYST BP LT 130 MM HG: CPT | Performed by: FAMILY MEDICINE

## 2019-01-15 PROCEDURE — 99214 OFFICE O/P EST MOD 30 MIN: CPT | Performed by: FAMILY MEDICINE

## 2019-01-15 PROCEDURE — 3078F DIAST BP <80 MM HG: CPT | Performed by: FAMILY MEDICINE

## 2019-01-15 NOTE — ASSESSMENT & PLAN NOTE
Asthma is stable he does not need Symbicort as recommended he says only takes 1 puff at night and is working okay, he has no recent asthma attack

## 2019-01-15 NOTE — PROGRESS NOTES
Assessment/Plan:    Problem List Items Addressed This Visit        Respiratory    Asthma - Primary     Asthma is stable he does not need Symbicort as recommended he says only takes 1 puff at night and is working okay, he has no recent asthma attack  Cardiovascular and Mediastinum    Essential hypertension     Hypertension is stable, continue amlodipine and valsartan  Low-salt diet         Relevant Orders    CBC and differential    Comprehensive metabolic panel    Lipid panel       Other    Hyperlipidemia     Hyperlipidemia improving, he lost weight and continue Lipitor  Labs reviewed with him and next labs will be in 6 months         Relevant Orders    Lipid panel    Anxiety     Anxiety under control and he is on Wellbutrin he will continue same               Chief Complaint   Patient presents with    Follow-up       Subjective:   Patient ID: Rishi Hendricks is a 39 y o  male  He is here follow-up on asthma which has been stable, he says he is currently using only 1 puff for Symbicort at night which is working and he has no asthma attack  Anxiety is under control and he is on Wellbutrin  He has been taking his blood pressure medication regularly, and is trying to lose weight, he has lost significant weight intentionally in last 6 months ,  He is on atorvastatin 40 mg and eating low-fat diet for hyperlipidemia  Review of Systems   Constitutional: Negative for activity change, appetite change, chills, diaphoresis, fatigue, fever and unexpected weight change  HENT: Negative for congestion, dental problem, ear discharge, ear pain, facial swelling, hearing loss, mouth sores, nosebleeds, postnasal drip, rhinorrhea, sinus pain, sinus pressure, sneezing, sore throat, trouble swallowing and voice change  Eyes: Negative for photophobia, pain, discharge, redness and itching  Respiratory: Negative for cough, chest tightness, shortness of breath and wheezing      Cardiovascular: Negative for chest pain, palpitations and leg swelling  Gastrointestinal: Negative for abdominal distention, abdominal pain, blood in stool, constipation, diarrhea and nausea  Endocrine: Negative for cold intolerance, heat intolerance, polydipsia, polyphagia and polyuria  Genitourinary: Negative for dysuria, flank pain, frequency, hematuria and urgency  Musculoskeletal: Negative for arthralgias, back pain, myalgias and neck pain  Skin: Negative for color change and pallor  Allergic/Immunologic: Negative for environmental allergies and food allergies  Neurological: Negative for dizziness, weakness, light-headedness, numbness and headaches  Hematological: Negative for adenopathy  Does not bruise/bleed easily  Psychiatric/Behavioral: Negative for behavioral problems, sleep disturbance and suicidal ideas  The patient is not nervous/anxious  Objective:  Physical Exam   Constitutional: He is oriented to person, place, and time  He appears well-developed and well-nourished  HENT:   Head: Normocephalic and atraumatic  Nose: Nose normal    Mouth/Throat: Oropharynx is clear and moist  No oropharyngeal exudate  Eyes: EOM are normal  Right eye exhibits no discharge  Left eye exhibits no discharge  No scleral icterus  Neck: Normal range of motion  Neck supple  No tracheal deviation present  No thyromegaly present  Cardiovascular: Normal rate, regular rhythm and normal heart sounds  No murmur heard  Pulmonary/Chest: Effort normal and breath sounds normal  No respiratory distress  He has no wheezes  He has no rales  Abdominal: Soft  Bowel sounds are normal  He exhibits no distension and no mass  There is no tenderness  There is no rebound  Musculoskeletal: Normal range of motion  He exhibits no edema  Lymphadenopathy:     He has no cervical adenopathy  Neurological: He is alert and oriented to person, place, and time  He has normal reflexes  No cranial nerve deficit  Skin: Skin is warm   No rash noted  No erythema  No pallor  Psychiatric: He has a normal mood and affect  His behavior is normal  Judgment and thought content normal    Nursing note and vitals reviewed           Past Surgical History:   Procedure Laterality Date    HERNIA REPAIR         Family History   Problem Relation Age of Onset    Sleep apnea Father         Adult    Atrial fibrillation Father     Stroke Father          Current Outpatient Prescriptions:     amLODIPine-valsartan (EXFORGE)  MG per tablet, take 1 tablet by mouth once daily, Disp: 90 tablet, Rfl: 1    aspirin 81 MG tablet, Take 1 tablet by mouth daily, Disp: , Rfl:     atorvastatin (LIPITOR) 40 mg tablet, take 1 tablet by mouth once daily, Disp: 90 tablet, Rfl: 0    buPROPion (WELLBUTRIN XL) 300 mg 24 hr tablet, Take 1 tablet (300 mg total) by mouth daily, Disp: 30 tablet, Rfl: 5    PROAIR  (90 Base) MCG/ACT inhaler, Inhale 2 puffs every 6 (six) hours as needed for wheezing, Disp: 1 Inhaler, Rfl: 0    SYMBICORT 160-4 5 MCG/ACT inhaler, Inhale 2 puffs 2 (two) times a day, Disp: 1 Inhaler, Rfl: 1    Allergies   Allergen Reactions    Fish Oil     Oxycodone-Acetaminophen      Reaction Date: 12Jun2012;        Vitals:    01/15/19 0757   BP: 120/68   Pulse: 92   Resp: 16   SpO2: 91%   Weight: 93 9 kg (207 lb)   Height: 5' 11" (1 803 m)

## 2019-01-15 NOTE — ASSESSMENT & PLAN NOTE
Hyperlipidemia improving, he lost weight and continue Lipitor  Labs reviewed with him and next labs will be in 6 months

## 2019-03-10 DIAGNOSIS — J45.20 MILD INTERMITTENT ASTHMA WITHOUT COMPLICATION: ICD-10-CM

## 2019-03-10 RX ORDER — BUDESONIDE AND FORMOTEROL FUMARATE DIHYDRATE 160; 4.5 UG/1; UG/1
AEROSOL RESPIRATORY (INHALATION)
Qty: 10.2 G | Refills: 1 | Status: SHIPPED | OUTPATIENT
Start: 2019-03-10 | End: 2019-11-02 | Stop reason: SDUPTHER

## 2019-04-02 DIAGNOSIS — F41.9 ANXIETY: ICD-10-CM

## 2019-04-02 RX ORDER — BUPROPION HYDROCHLORIDE 300 MG/1
TABLET ORAL
Qty: 30 TABLET | Refills: 5 | Status: SHIPPED | OUTPATIENT
Start: 2019-04-02 | End: 2019-09-27 | Stop reason: SDUPTHER

## 2019-04-12 DIAGNOSIS — J45.20 MILD INTERMITTENT ASTHMA WITHOUT COMPLICATION: ICD-10-CM

## 2019-04-22 DIAGNOSIS — E78.2 MIXED HYPERLIPIDEMIA: ICD-10-CM

## 2019-04-23 RX ORDER — ATORVASTATIN CALCIUM 40 MG/1
TABLET, FILM COATED ORAL
Qty: 90 TABLET | Refills: 0 | Status: SHIPPED | OUTPATIENT
Start: 2019-04-23 | End: 2019-07-18 | Stop reason: SDUPTHER

## 2019-07-06 LAB
ALBUMIN SERPL-MCNC: 4.4 G/DL (ref 3.6–5.1)
ALBUMIN/GLOB SERPL: 2 (CALC) (ref 1–2.5)
ALP SERPL-CCNC: 49 U/L (ref 40–115)
ALT SERPL-CCNC: 20 U/L (ref 9–46)
AST SERPL-CCNC: 13 U/L (ref 10–40)
BASOPHILS # BLD AUTO: 21 CELLS/UL (ref 0–200)
BASOPHILS NFR BLD AUTO: 0.2 %
BILIRUB SERPL-MCNC: 0.5 MG/DL (ref 0.2–1.2)
BUN SERPL-MCNC: 17 MG/DL (ref 7–25)
BUN/CREAT SERPL: NORMAL (CALC) (ref 6–22)
CALCIUM SERPL-MCNC: 9.4 MG/DL (ref 8.6–10.3)
CHLORIDE SERPL-SCNC: 109 MMOL/L (ref 98–110)
CHOLEST SERPL-MCNC: 130 MG/DL
CHOLEST/HDLC SERPL: 3.5 (CALC)
CO2 SERPL-SCNC: 26 MMOL/L (ref 20–32)
CREAT SERPL-MCNC: 1.03 MG/DL (ref 0.6–1.35)
EOSINOPHIL # BLD AUTO: 276 CELLS/UL (ref 15–500)
EOSINOPHIL NFR BLD AUTO: 2.6 %
ERYTHROCYTE [DISTWIDTH] IN BLOOD BY AUTOMATED COUNT: 12.4 % (ref 11–15)
GLOBULIN SER CALC-MCNC: 2.2 G/DL (CALC) (ref 1.9–3.7)
GLUCOSE SERPL-MCNC: 99 MG/DL (ref 65–99)
HCT VFR BLD AUTO: 44.1 % (ref 38.5–50)
HDLC SERPL-MCNC: 37 MG/DL
HGB BLD-MCNC: 14.8 G/DL (ref 13.2–17.1)
LDLC SERPL CALC-MCNC: 76 MG/DL (CALC)
LYMPHOCYTES # BLD AUTO: 1707 CELLS/UL (ref 850–3900)
LYMPHOCYTES NFR BLD AUTO: 16.1 %
MCH RBC QN AUTO: 31.8 PG (ref 27–33)
MCHC RBC AUTO-ENTMCNC: 33.6 G/DL (ref 32–36)
MCV RBC AUTO: 94.6 FL (ref 80–100)
MONOCYTES # BLD AUTO: 689 CELLS/UL (ref 200–950)
MONOCYTES NFR BLD AUTO: 6.5 %
NEUTROPHILS # BLD AUTO: 7908 CELLS/UL (ref 1500–7800)
NEUTROPHILS NFR BLD AUTO: 74.6 %
NONHDLC SERPL-MCNC: 93 MG/DL (CALC)
PLATELET # BLD AUTO: 197 THOUSAND/UL (ref 140–400)
PMV BLD REES-ECKER: 11 FL (ref 7.5–12.5)
POTASSIUM SERPL-SCNC: 4.3 MMOL/L (ref 3.5–5.3)
PROT SERPL-MCNC: 6.6 G/DL (ref 6.1–8.1)
RBC # BLD AUTO: 4.66 MILLION/UL (ref 4.2–5.8)
SL AMB EGFR AFRICAN AMERICAN: 102 ML/MIN/1.73M2
SL AMB EGFR NON AFRICAN AMERICAN: 88 ML/MIN/1.73M2
SODIUM SERPL-SCNC: 141 MMOL/L (ref 135–146)
TRIGL SERPL-MCNC: 87 MG/DL
WBC # BLD AUTO: 10.6 THOUSAND/UL (ref 3.8–10.8)

## 2019-07-18 ENCOUNTER — OFFICE VISIT (OUTPATIENT)
Dept: FAMILY MEDICINE CLINIC | Facility: CLINIC | Age: 44
End: 2019-07-18
Payer: COMMERCIAL

## 2019-07-18 VITALS
OXYGEN SATURATION: 97 % | SYSTOLIC BLOOD PRESSURE: 122 MMHG | BODY MASS INDEX: 28.73 KG/M2 | HEIGHT: 71 IN | DIASTOLIC BLOOD PRESSURE: 68 MMHG | WEIGHT: 205.2 LBS | RESPIRATION RATE: 18 BRPM | HEART RATE: 83 BPM

## 2019-07-18 DIAGNOSIS — R73.01 IMPAIRED FASTING GLUCOSE: ICD-10-CM

## 2019-07-18 DIAGNOSIS — D72.9 ABNORMAL NEUTROPHIL COUNT: Primary | ICD-10-CM

## 2019-07-18 DIAGNOSIS — E78.2 MIXED HYPERLIPIDEMIA: ICD-10-CM

## 2019-07-18 DIAGNOSIS — G47.33 OSA (OBSTRUCTIVE SLEEP APNEA): ICD-10-CM

## 2019-07-18 DIAGNOSIS — J45.20 MILD INTERMITTENT ASTHMA WITHOUT COMPLICATION: ICD-10-CM

## 2019-07-18 DIAGNOSIS — I10 ESSENTIAL HYPERTENSION: ICD-10-CM

## 2019-07-18 PROCEDURE — 3074F SYST BP LT 130 MM HG: CPT | Performed by: FAMILY MEDICINE

## 2019-07-18 PROCEDURE — 3078F DIAST BP <80 MM HG: CPT | Performed by: FAMILY MEDICINE

## 2019-07-18 PROCEDURE — 3008F BODY MASS INDEX DOCD: CPT | Performed by: FAMILY MEDICINE

## 2019-07-18 PROCEDURE — 99214 OFFICE O/P EST MOD 30 MIN: CPT | Performed by: FAMILY MEDICINE

## 2019-07-18 RX ORDER — AMOXICILLIN 500 MG/1
500 CAPSULE ORAL EVERY 8 HOURS SCHEDULED
Qty: 21 CAPSULE | Refills: 0 | Status: SHIPPED | OUTPATIENT
Start: 2019-07-18 | End: 2019-07-25

## 2019-07-18 RX ORDER — ATORVASTATIN CALCIUM 40 MG/1
40 TABLET, FILM COATED ORAL DAILY
Qty: 90 TABLET | Refills: 1 | Status: SHIPPED | OUTPATIENT
Start: 2019-07-18 | End: 2020-01-09

## 2019-07-18 RX ORDER — AMLODIPINE AND VALSARTAN 10; 160 MG/1; MG/1
1 TABLET ORAL DAILY
Qty: 90 TABLET | Refills: 1 | Status: SHIPPED | OUTPATIENT
Start: 2019-07-18 | End: 2020-01-23 | Stop reason: SDUPTHER

## 2019-07-18 NOTE — ASSESSMENT & PLAN NOTE
Hyperlipidemia is improving significantly, he will continue atorvastatin 40 mg daily and low-fat healthy diet and exercise

## 2019-07-18 NOTE — ASSESSMENT & PLAN NOTE
Slightly high neutrophil count, with sore throat and tender lymph node in the neck, will put him on antibiotic and advised to repeat CBC in 3 weeks

## 2019-07-18 NOTE — PROGRESS NOTES
Assessment/Plan:    Problem List Items Addressed This Visit        Endocrine    Impaired fasting glucose  His blood sugar is normal now, continue low carb diet and portion control       Respiratory    JOSEE (obstructive sleep apnea)     Using CPAP machine and is stable         Mild intermittent asthma without complication     Use Symbicort puff only 1 daily and is stable, does not need a refill            Cardiovascular and Mediastinum    Essential hypertension     Hypertension stable continue same medication Exforge,  Continue low-salt healthy diet, recheck labs in 6 months         Relevant Medications    amLODIPine-valsartan (EXFORGE)  MG per tablet    Other Relevant Orders    CBC and differential    Comprehensive metabolic panel       Other    Hyperlipidemia     Hyperlipidemia is improving significantly, he will continue atorvastatin 40 mg daily and low-fat healthy diet and exercise         Relevant Medications    atorvastatin (LIPITOR) 40 mg tablet    Other Relevant Orders    Lipid panel    Abnormal neutrophil count - Primary     Slightly high neutrophil count, with sore throat and tender lymph node in the neck, will put him on antibiotic and advised to repeat CBC in 3 weeks         Relevant Medications    amoxicillin (AMOXIL) 500 mg capsule    Other Relevant Orders    CBC and differential      BMI Counseling: Body mass index is 28 62 kg/m²  Discussed the patient's BMI with him  The BMI is above average  BMI counseling and education was provided to the patient  Nutrition recommendations include reducing portion sizes and consuming healthier snacks  Exercise recommendations include moderate aerobic physical activity for 150 minutes/week  Chief Complaint   Patient presents with    Follow-up       Subjective:   Patient ID: Georgi Jin is a 39 y o  male      He is here for follow-up on hypertension, hyperlipidemia and he has asthma, sleep apnea and used CPAP machine, trying to eat small portion, healthy diet, salad  2 times daily and he does treadmill exercise almost every day  He says for last few days he has been feeling scratchy type of throat and sometimes his left side of the neck gland gets slightly tender and mild discomfort in the left ear, and then he saw his blood count was showing elevated neutrophil count, so he is thinking he has infection,  Denies any fever or chills denies any chest pain or shortness of breath  Anxiety is under control with Wellbutrin      Review of Systems   Constitutional: Negative for activity change, appetite change, chills, diaphoresis, fatigue, fever and unexpected weight change  HENT: Positive for sore throat  Negative for congestion, dental problem, ear discharge, ear pain, facial swelling, hearing loss, mouth sores, nosebleeds, postnasal drip, rhinorrhea, sinus pressure, sinus pain, sneezing, trouble swallowing and voice change  Eyes: Negative for photophobia, pain, discharge, redness and itching  Respiratory: Negative for cough, chest tightness, shortness of breath and wheezing  Cardiovascular: Negative for chest pain, palpitations and leg swelling  Gastrointestinal: Negative for abdominal distention, abdominal pain, blood in stool, constipation, diarrhea and nausea  Endocrine: Negative for cold intolerance, heat intolerance, polydipsia, polyphagia and polyuria  Genitourinary: Negative for dysuria, flank pain, frequency, hematuria and urgency  Musculoskeletal: Negative for arthralgias, back pain, myalgias and neck pain  Skin: Negative for color change and pallor  Allergic/Immunologic: Negative for environmental allergies and food allergies  Neurological: Negative for dizziness, weakness, light-headedness, numbness and headaches  Hematological: Negative for adenopathy  Does not bruise/bleed easily  Psychiatric/Behavioral: Negative for behavioral problems, sleep disturbance and suicidal ideas  The patient is not nervous/anxious  Objective:  Physical Exam   Constitutional: He is oriented to person, place, and time  He appears well-developed and well-nourished  HENT:   Head: Normocephalic and atraumatic  Right Ear: External ear normal    Left Ear: External ear normal    Nose: Nose normal    Mouth/Throat: Oropharynx is clear and moist  No oropharyngeal exudate  Minimal erythema in the left ear,   Eyes: Pupils are equal, round, and reactive to light  Conjunctivae and EOM are normal  Right eye exhibits no discharge  Left eye exhibits no discharge  No scleral icterus  Neck: Normal range of motion  Neck supple  No tracheal deviation present  No thyromegaly present  Cardiovascular: Normal rate, regular rhythm and normal heart sounds  No murmur heard  Pulmonary/Chest: Effort normal and breath sounds normal  No respiratory distress  He has no wheezes  He has no rales  Abdominal: Soft  Bowel sounds are normal  He exhibits no distension and no mass  There is no tenderness  There is no rebound  Musculoskeletal: Normal range of motion  He exhibits no edema  Lymphadenopathy:     He has no cervical adenopathy  Neurological: He is alert and oriented to person, place, and time  He has normal reflexes  No cranial nerve deficit  Skin: Skin is warm  No rash noted  No erythema  No pallor  Psychiatric: He has a normal mood and affect  His behavior is normal  Judgment and thought content normal    Nursing note and vitals reviewed           Past Surgical History:   Procedure Laterality Date    HERNIA REPAIR         Family History   Problem Relation Age of Onset    Sleep apnea Father         Adult    Atrial fibrillation Father     Stroke Father          Current Outpatient Medications:     amLODIPine-valsartan (EXFORGE)  MG per tablet, Take 1 tablet by mouth daily, Disp: 90 tablet, Rfl: 1    aspirin 81 MG tablet, Take 1 tablet by mouth daily, Disp: , Rfl:     atorvastatin (LIPITOR) 40 mg tablet, Take 1 tablet (40 mg total) by mouth daily, Disp: 90 tablet, Rfl: 1    buPROPion (WELLBUTRIN XL) 300 mg 24 hr tablet, take 1 tablet by mouth once daily, Disp: 30 tablet, Rfl: 5    PROAIR  (90 Base) MCG/ACT inhaler, inhale 2 puffs by mouth every 6 hours if needed wheezing, Disp: 8 5 g, Rfl: 0    SYMBICORT 160-4 5 MCG/ACT inhaler, inhale 2 puffs by mouth twice a day, Disp: 10 2 g, Rfl: 1    amoxicillin (AMOXIL) 500 mg capsule, Take 1 capsule (500 mg total) by mouth every 8 (eight) hours for 7 days, Disp: 21 capsule, Rfl: 0    Allergies   Allergen Reactions    Fish Oil     Oxycodone-Acetaminophen      Reaction Date: 12Jun2012;        Vitals:    07/18/19 0755   BP: 122/68   Pulse: 83   Resp: 18   SpO2: 97%   Weight: 93 1 kg (205 lb 3 2 oz)   Height: 5' 11" (1 803 m)

## 2019-07-18 NOTE — ASSESSMENT & PLAN NOTE
Hypertension stable continue same medication Exforge,  Continue low-salt healthy diet, recheck labs in 6 months

## 2019-08-05 ENCOUNTER — OFFICE VISIT (OUTPATIENT)
Dept: SLEEP CENTER | Facility: CLINIC | Age: 44
End: 2019-08-05
Payer: COMMERCIAL

## 2019-08-05 ENCOUNTER — TELEPHONE (OUTPATIENT)
Dept: SLEEP CENTER | Facility: CLINIC | Age: 44
End: 2019-08-05

## 2019-08-05 VITALS
WEIGHT: 206 LBS | DIASTOLIC BLOOD PRESSURE: 80 MMHG | SYSTOLIC BLOOD PRESSURE: 132 MMHG | HEIGHT: 71 IN | BODY MASS INDEX: 28.84 KG/M2 | HEART RATE: 76 BPM

## 2019-08-05 DIAGNOSIS — E66.3 OVERWEIGHT (BMI 25.0-29.9): ICD-10-CM

## 2019-08-05 DIAGNOSIS — J45.20 MILD INTERMITTENT ASTHMA WITHOUT COMPLICATION: ICD-10-CM

## 2019-08-05 DIAGNOSIS — R53.83 FATIGUE DUE TO SLEEP PATTERN DISTURBANCE: ICD-10-CM

## 2019-08-05 DIAGNOSIS — G47.9 FATIGUE DUE TO SLEEP PATTERN DISTURBANCE: ICD-10-CM

## 2019-08-05 DIAGNOSIS — F41.9 ANXIETY: ICD-10-CM

## 2019-08-05 DIAGNOSIS — G47.33 OSA (OBSTRUCTIVE SLEEP APNEA): Primary | ICD-10-CM

## 2019-08-05 DIAGNOSIS — I10 ESSENTIAL HYPERTENSION: ICD-10-CM

## 2019-08-05 DIAGNOSIS — F45.8 AEROPHAGIA: ICD-10-CM

## 2019-08-05 PROCEDURE — 99214 OFFICE O/P EST MOD 30 MIN: CPT | Performed by: INTERNAL MEDICINE

## 2019-08-05 NOTE — PROGRESS NOTES
Review of Systems      Genitourinary none   Cardiology none   Gastrointestinal abdominal pain or cramping that disturb sleep    Neurology none   Constitutional none   Integumentary none   Psychiatry anxiety and aggressiveness or irritability   Musculoskeletal none   Pulmonary none   ENT none   Endocrine none   Hematological none

## 2019-08-05 NOTE — PROGRESS NOTES
Follow-Up Note - [address]  39 y o  male  :1975  DRW:[id number]    CC: I saw this patient for follow-up in clinic today for his Sleep Disordered Breathing, Coexisting Sleep and Medical Problems  PFSH, Problem List, Medications & Allergies were reviewed in EMR  Interval changes: none reported  He  has a past medical history of Benign essential hypertension (2006), Cellulitis of ankle, Hyperlipidemia (2006), and Sting from hornet, wasp, or bee  He has a current medication list which includes the following prescription(s): amlodipine-valsartan, aspirin, atorvastatin, bupropion, proair hfa, and symbicort  ROS: Reviewed (see attached)  Significant for intentional weight loss of around 30 lb  Allergies and asthma are controlled  He feels anxiety is stable on current medication  DATA REVIEWED:  using PAP > 4 hours/night 90% of the time  Estimated RL 0 9/hour at pressure of 16 9cm H2O @90th percentile  SUBJECTIVE: Regarding use of PAP, Robel Rizo reports:   · He is experiencing significant adverse effects: pressure too high and abdominal bloating or discomfort  · He is   benefiting from use: sleeping better   Sleep Routine: He reports getting 7 hrs sleep  ; he has no difficulty initiating, but reports diffculty maintaining sleep   When sleep is interrupted, at times he has difficulty falling back asleep  He awakens with the aid of an alarm and is not always refreshed  He denied excessive drowsiness   He rated himself at Total score: 2 /24 on the Cicero sleepiness scale  Habits: reports that he has quit smoking  He has never used smokeless tobacco ,  reports that he drinks alcohol ,  reports that he does not use drugs  , Caffeine use: limited , Exercise routine: regular    He denies eating or drinking close to bedtime      OBJECTIVE: /80   Pulse 76   Ht 5' 11" (1 803 m)   Wt 93 4 kg (206 lb)   BMI 28 73 kg/m²    Constitutional: Patient is well groomed; well appearing  Skin/Extrem: warm & dry; col & hydration normal; no edema  Psych: cooperativeand in no distress  Normal mood, affect & thought   CNS: Alert, orientated, clear & coherent speech  H&N: EOMI; NC/AT:no facial pressure marks, no rashes  Neck Circumference: 15 75 cm  ENMT Mucus membranes normal Nasal airway:patent  Oral airway: crowded  Resp:effort is normal CVS: RRR ABD:truncal obesity MSK:Gait normal     ASSESSMENT: Primary Sleep/Secondary(to Medical or Psych conditions) & comorbidities   1  JOSEE (obstructive sleep apnea)     2  Aerophagia     3  Mild intermittent asthma without complication     4  Fatigue due to sleep pattern disturbance     5  Essential hypertension     6  Overweight (BMI 25 0-29 9)     7  Anxiety       PLAN:  1  Treatment with  PAP is medically necessary and Rachel Segura is agreable to continue use  2  Care of equipment, methods to improve comfort using PAP and importance of compliance with therapy were discussed  3  Pressure setting: change 13-17 cmH2O and EPR at level 3     4  Rx provided to replace supplies and Care coordinated with DME provider  5  Strategies for weight reduction were discussed  6  Follow-up is advised in 1 year or sooner if needed to monitor progress, compliance and to adjust therapy  Thank you for allowing me to participate in the care of this patient      Sincerely,    Authenticated electronically by Emory Guerra MD on 42/85/14   Board Certified Specialist

## 2019-08-05 NOTE — TELEPHONE ENCOUNTER
Received an RX for a pressure change  Changed accordingly  PT's now set to an APAP of 13-17cm   Informed PT when he was in the office

## 2019-08-09 LAB
BASOPHILS # BLD AUTO: 24 CELLS/UL (ref 0–200)
BASOPHILS NFR BLD AUTO: 0.3 %
EOSINOPHIL # BLD AUTO: 221 CELLS/UL (ref 15–500)
EOSINOPHIL NFR BLD AUTO: 2.8 %
ERYTHROCYTE [DISTWIDTH] IN BLOOD BY AUTOMATED COUNT: 12.3 % (ref 11–15)
HCT VFR BLD AUTO: 45.4 % (ref 38.5–50)
HGB BLD-MCNC: 15.3 G/DL (ref 13.2–17.1)
LYMPHOCYTES # BLD AUTO: 1967 CELLS/UL (ref 850–3900)
LYMPHOCYTES NFR BLD AUTO: 24.9 %
MCH RBC QN AUTO: 31.6 PG (ref 27–33)
MCHC RBC AUTO-ENTMCNC: 33.7 G/DL (ref 32–36)
MCV RBC AUTO: 93.8 FL (ref 80–100)
MONOCYTES # BLD AUTO: 593 CELLS/UL (ref 200–950)
MONOCYTES NFR BLD AUTO: 7.5 %
NEUTROPHILS # BLD AUTO: 5096 CELLS/UL (ref 1500–7800)
NEUTROPHILS NFR BLD AUTO: 64.5 %
PLATELET # BLD AUTO: 215 THOUSAND/UL (ref 140–400)
PMV BLD REES-ECKER: 10.7 FL (ref 7.5–12.5)
RBC # BLD AUTO: 4.84 MILLION/UL (ref 4.2–5.8)
WBC # BLD AUTO: 7.9 THOUSAND/UL (ref 3.8–10.8)

## 2019-09-27 DIAGNOSIS — F41.9 ANXIETY: ICD-10-CM

## 2019-09-29 RX ORDER — BUPROPION HYDROCHLORIDE 300 MG/1
TABLET ORAL
Qty: 30 TABLET | Refills: 5 | Status: SHIPPED | OUTPATIENT
Start: 2019-09-29 | End: 2020-01-23 | Stop reason: SDUPTHER

## 2019-11-02 DIAGNOSIS — J45.20 MILD INTERMITTENT ASTHMA WITHOUT COMPLICATION: ICD-10-CM

## 2019-11-03 RX ORDER — BUDESONIDE AND FORMOTEROL FUMARATE DIHYDRATE 160; 4.5 UG/1; UG/1
AEROSOL RESPIRATORY (INHALATION)
Qty: 10.2 G | Refills: 1 | Status: SHIPPED | OUTPATIENT
Start: 2019-11-03 | End: 2020-01-23 | Stop reason: SDUPTHER

## 2020-01-09 DIAGNOSIS — E78.2 MIXED HYPERLIPIDEMIA: ICD-10-CM

## 2020-01-09 RX ORDER — ATORVASTATIN CALCIUM 40 MG/1
TABLET, FILM COATED ORAL
Qty: 90 TABLET | Refills: 0 | Status: SHIPPED | OUTPATIENT
Start: 2020-01-09 | End: 2020-04-10

## 2020-01-11 LAB
ALBUMIN SERPL-MCNC: 4.4 G/DL (ref 3.6–5.1)
ALBUMIN/GLOB SERPL: 1.9 (CALC) (ref 1–2.5)
ALP SERPL-CCNC: 47 U/L (ref 40–115)
ALT SERPL-CCNC: 18 U/L (ref 9–46)
AST SERPL-CCNC: 13 U/L (ref 10–40)
BASOPHILS # BLD AUTO: 10 CELLS/UL (ref 0–200)
BASOPHILS NFR BLD AUTO: 0.2 %
BILIRUB SERPL-MCNC: 0.4 MG/DL (ref 0.2–1.2)
BUN SERPL-MCNC: 20 MG/DL (ref 7–25)
BUN/CREAT SERPL: NORMAL (CALC) (ref 6–22)
CALCIUM SERPL-MCNC: 9.2 MG/DL (ref 8.6–10.3)
CHLORIDE SERPL-SCNC: 106 MMOL/L (ref 98–110)
CHOLEST SERPL-MCNC: 134 MG/DL
CHOLEST/HDLC SERPL: 3.8 (CALC)
CO2 SERPL-SCNC: 29 MMOL/L (ref 20–32)
CREAT SERPL-MCNC: 1.17 MG/DL (ref 0.6–1.35)
EOSINOPHIL # BLD AUTO: 130 CELLS/UL (ref 15–500)
EOSINOPHIL NFR BLD AUTO: 2.5 %
ERYTHROCYTE [DISTWIDTH] IN BLOOD BY AUTOMATED COUNT: 12.5 % (ref 11–15)
GLOBULIN SER CALC-MCNC: 2.3 G/DL (CALC) (ref 1.9–3.7)
GLUCOSE SERPL-MCNC: 95 MG/DL (ref 65–99)
HCT VFR BLD AUTO: 42.5 % (ref 38.5–50)
HDLC SERPL-MCNC: 35 MG/DL
HGB BLD-MCNC: 14.4 G/DL (ref 13.2–17.1)
LDLC SERPL CALC-MCNC: 81 MG/DL (CALC)
LYMPHOCYTES # BLD AUTO: 1456 CELLS/UL (ref 850–3900)
LYMPHOCYTES NFR BLD AUTO: 28 %
MCH RBC QN AUTO: 32.2 PG (ref 27–33)
MCHC RBC AUTO-ENTMCNC: 33.9 G/DL (ref 32–36)
MCV RBC AUTO: 95.1 FL (ref 80–100)
MONOCYTES # BLD AUTO: 515 CELLS/UL (ref 200–950)
MONOCYTES NFR BLD AUTO: 9.9 %
NEUTROPHILS # BLD AUTO: 3089 CELLS/UL (ref 1500–7800)
NEUTROPHILS NFR BLD AUTO: 59.4 %
NONHDLC SERPL-MCNC: 99 MG/DL (CALC)
PLATELET # BLD AUTO: 182 THOUSAND/UL (ref 140–400)
PMV BLD REES-ECKER: 10.7 FL (ref 7.5–12.5)
POTASSIUM SERPL-SCNC: 4.4 MMOL/L (ref 3.5–5.3)
PROT SERPL-MCNC: 6.7 G/DL (ref 6.1–8.1)
RBC # BLD AUTO: 4.47 MILLION/UL (ref 4.2–5.8)
SL AMB EGFR AFRICAN AMERICAN: 87 ML/MIN/1.73M2
SL AMB EGFR NON AFRICAN AMERICAN: 75 ML/MIN/1.73M2
SODIUM SERPL-SCNC: 140 MMOL/L (ref 135–146)
TRIGL SERPL-MCNC: 93 MG/DL
WBC # BLD AUTO: 5.2 THOUSAND/UL (ref 3.8–10.8)

## 2020-01-23 ENCOUNTER — OFFICE VISIT (OUTPATIENT)
Dept: FAMILY MEDICINE CLINIC | Facility: CLINIC | Age: 45
End: 2020-01-23
Payer: COMMERCIAL

## 2020-01-23 VITALS
OXYGEN SATURATION: 96 % | HEART RATE: 91 BPM | DIASTOLIC BLOOD PRESSURE: 60 MMHG | RESPIRATION RATE: 18 BRPM | HEIGHT: 71 IN | BODY MASS INDEX: 29.34 KG/M2 | SYSTOLIC BLOOD PRESSURE: 110 MMHG | WEIGHT: 209.6 LBS

## 2020-01-23 DIAGNOSIS — F41.9 ANXIETY: ICD-10-CM

## 2020-01-23 DIAGNOSIS — E78.2 MIXED HYPERLIPIDEMIA: ICD-10-CM

## 2020-01-23 DIAGNOSIS — G47.33 OSA (OBSTRUCTIVE SLEEP APNEA): ICD-10-CM

## 2020-01-23 DIAGNOSIS — I10 ESSENTIAL HYPERTENSION: ICD-10-CM

## 2020-01-23 DIAGNOSIS — J45.20 MILD INTERMITTENT ASTHMA WITHOUT COMPLICATION: ICD-10-CM

## 2020-01-23 DIAGNOSIS — J45.30 MILD PERSISTENT ASTHMA WITHOUT COMPLICATION: ICD-10-CM

## 2020-01-23 DIAGNOSIS — Z23 NEED FOR VACCINATION FOR PNEUMOCOCCUS: Primary | ICD-10-CM

## 2020-01-23 PROBLEM — D72.9 ABNORMAL NEUTROPHIL COUNT: Status: RESOLVED | Noted: 2019-07-18 | Resolved: 2020-01-23

## 2020-01-23 PROBLEM — E66.9 OBESITY (BMI 30-39.9): Status: RESOLVED | Noted: 2018-04-04 | Resolved: 2020-01-23

## 2020-01-23 PROCEDURE — 3074F SYST BP LT 130 MM HG: CPT | Performed by: FAMILY MEDICINE

## 2020-01-23 PROCEDURE — 90732 PPSV23 VACC 2 YRS+ SUBQ/IM: CPT | Performed by: FAMILY MEDICINE

## 2020-01-23 PROCEDURE — 3078F DIAST BP <80 MM HG: CPT | Performed by: FAMILY MEDICINE

## 2020-01-23 PROCEDURE — 1036F TOBACCO NON-USER: CPT | Performed by: FAMILY MEDICINE

## 2020-01-23 PROCEDURE — 90471 IMMUNIZATION ADMIN: CPT | Performed by: FAMILY MEDICINE

## 2020-01-23 PROCEDURE — 99214 OFFICE O/P EST MOD 30 MIN: CPT | Performed by: FAMILY MEDICINE

## 2020-01-23 PROCEDURE — 3008F BODY MASS INDEX DOCD: CPT | Performed by: FAMILY MEDICINE

## 2020-01-23 RX ORDER — AMLODIPINE AND VALSARTAN 10; 160 MG/1; MG/1
1 TABLET ORAL DAILY
Qty: 90 TABLET | Refills: 1 | Status: SHIPPED | OUTPATIENT
Start: 2020-01-23 | End: 2020-04-26

## 2020-01-23 RX ORDER — BUDESONIDE AND FORMOTEROL FUMARATE DIHYDRATE 160; 4.5 UG/1; UG/1
2 AEROSOL RESPIRATORY (INHALATION) 2 TIMES DAILY
Qty: 10.2 G | Refills: 1 | Status: SHIPPED | OUTPATIENT
Start: 2020-01-23 | End: 2020-10-12

## 2020-01-23 RX ORDER — BUPROPION HYDROCHLORIDE 300 MG/1
300 TABLET ORAL DAILY
Qty: 30 TABLET | Refills: 5 | Status: SHIPPED | OUTPATIENT
Start: 2020-01-23 | End: 2020-07-20

## 2020-01-23 NOTE — ASSESSMENT & PLAN NOTE
He has HDL is low, he does regular exercise trying to losing weight    And he wants to stop Lipitor, discussed with him that instead of stopping will cut down his dose to 20 mg daily and recheck labs in 6 months

## 2020-01-23 NOTE — PROGRESS NOTES
Assessment/Plan:    Problem List Items Addressed This Visit        Respiratory    JOSEE (obstructive sleep apnea)     He use a CPAP machine, is trying to working on losing weight, pneumonia vaccine 23 is given today         Asthma     Mild persistent asthma, stable on Symbicort he only use 1 puff daily, refill given on ProAir         Relevant Medications    SYMBICORT 160-4 5 MCG/ACT inhaler    PROAIR  (90 Base) MCG/ACT inhaler    RESOLVED: Mild intermittent asthma without complication    Relevant Medications    SYMBICORT 160-4 5 MCG/ACT inhaler    PROAIR  (90 Base) MCG/ACT inhaler       Cardiovascular and Mediastinum    Essential hypertension     Continue low-salt low-fat diet, his blood pressure is stable I will keep him on same medication         Relevant Medications    amLODIPine-valsartan (EXFORGE)  MG per tablet    Other Relevant Orders    CBC and differential    Comprehensive metabolic panel    Microalbumin / creatinine urine ratio       Other    Hyperlipidemia     He has HDL is low, he does regular exercise trying to losing weight  And he wants to stop Lipitor, discussed with him that instead of stopping will cut down his dose to 20 mg daily and recheck labs in 6 months         Relevant Orders    Lipid panel    TSH, 3rd generation    Need for vaccination for pneumococcus - Primary    Relevant Orders    PNEUMOCOCCAL POLYSACCHARIDE VACCINE 23-VALENT =>3YO SQ IM (Completed)    Anxiety    Relevant Medications    buPROPion (WELLBUTRIN XL) 300 mg 24 hr tablet          Chief Complaint   Patient presents with    Follow-up     BMI Counseling: Body mass index is 29 23 kg/m²  The BMI is above normal  Nutrition recommendations include decreasing portion sizes, encouraging healthy choices of fruits and vegetables, limiting drinks that contain sugar, moderation in carbohydrate intake and reducing intake of cholesterol  Exercise recommendations include exercising 3-5 times per week           Subjective: Patient ID: Mary Ann Stauffer is a 38 y o  male  Follow-up on hypertension, hyperlipidemia, he use the CPAP machine as he has sleep apnea  He says mostly his blood pressure is good but at work when he checks sometimes is above 944 systolic and diastolic can range from 50-16, he takes his medicine regularly denies any lightheaded dizziness chest pain shortness of breath, he has no leg swelling  He tries to eat healthy and do regular exercise, he has been on Lipitor 40 mg for long time and now he says he has been trying to lose weight so he want to cut down his medicine or stop the medicine  Anxiety is under control with Wellbutrin  Denies any depression  He has mild persistent asthma, he has been using Symbicort 1 puff only once a day, he does not need ProAir frequently and needs a refill on ProAir      Review of Systems   Constitutional: Negative for activity change, appetite change, chills, diaphoresis, fatigue, fever and unexpected weight change  HENT: Negative for congestion, dental problem, ear discharge, ear pain, facial swelling, hearing loss, mouth sores, nosebleeds, postnasal drip, rhinorrhea, sinus pressure, sinus pain, sneezing, sore throat, trouble swallowing and voice change  Eyes: Negative for photophobia, pain, discharge, redness and itching  Respiratory: Negative for cough, chest tightness, shortness of breath and wheezing  Cardiovascular: Negative for chest pain, palpitations and leg swelling  Gastrointestinal: Negative for abdominal distention, abdominal pain, blood in stool, constipation, diarrhea and nausea  Endocrine: Negative for cold intolerance, heat intolerance, polydipsia, polyphagia and polyuria  Genitourinary: Negative for dysuria, flank pain, frequency, hematuria and urgency  Musculoskeletal: Negative for arthralgias, back pain, myalgias and neck pain  Skin: Negative for color change and pallor     Allergic/Immunologic: Negative for environmental allergies and food allergies  Neurological: Negative for dizziness, weakness, light-headedness, numbness and headaches  Hematological: Negative for adenopathy  Does not bruise/bleed easily  Psychiatric/Behavioral: Negative for behavioral problems, sleep disturbance and suicidal ideas  The patient is not nervous/anxious  Objective:  Physical Exam   Constitutional: He is oriented to person, place, and time  He appears well-developed and well-nourished  HENT:   Head: Normocephalic and atraumatic  Nose: Nose normal    Mouth/Throat: Oropharynx is clear and moist  No oropharyngeal exudate  Eyes: Pupils are equal, round, and reactive to light  Conjunctivae and EOM are normal  Right eye exhibits no discharge  Left eye exhibits no discharge  No scleral icterus  Neck: Normal range of motion  Neck supple  No tracheal deviation present  No thyromegaly present  Cardiovascular: Normal rate, regular rhythm and normal heart sounds  No murmur heard  Pulmonary/Chest: Effort normal and breath sounds normal  No respiratory distress  He has no wheezes  He has no rales  Abdominal: Soft  Bowel sounds are normal  He exhibits no distension and no mass  There is no tenderness  There is no rebound  Musculoskeletal: Normal range of motion  He exhibits no edema  Lymphadenopathy:     He has no cervical adenopathy  Neurological: He is alert and oriented to person, place, and time  He has normal reflexes  No cranial nerve deficit  Skin: Skin is warm  No rash noted  No erythema  No pallor  Psychiatric: He has a normal mood and affect  His behavior is normal  Judgment and thought content normal    Nursing note and vitals reviewed           Past Surgical History:   Procedure Laterality Date    HERNIA REPAIR         Family History   Problem Relation Age of Onset    Sleep apnea Father         Adult    Atrial fibrillation Father     Stroke Father          Current Outpatient Medications:     amLODIPine-valsartan (Laban Jumbo)  MG per tablet, Take 1 tablet by mouth daily, Disp: 90 tablet, Rfl: 1    aspirin 81 MG tablet, Take 1 tablet by mouth daily, Disp: , Rfl:     atorvastatin (LIPITOR) 40 mg tablet, take 1 tablet by mouth once daily, Disp: 90 tablet, Rfl: 0    buPROPion (WELLBUTRIN XL) 300 mg 24 hr tablet, Take 1 tablet (300 mg total) by mouth daily, Disp: 30 tablet, Rfl: 5    PROAIR  (90 Base) MCG/ACT inhaler, Inhale 2 puffs every 6 (six) hours as needed for wheezing, Disp: 8 5 g, Rfl: 1    SYMBICORT 160-4 5 MCG/ACT inhaler, Inhale 2 puffs 2 (two) times a day Rinse mouth after use , Disp: 10 2 g, Rfl: 1    Allergies   Allergen Reactions    Fish Oil     Oxycodone-Acetaminophen      Reaction Date: 12Jun2012;        Vitals:    01/23/20 0925 01/23/20 0955   BP: 120/80 110/60   Pulse: 91    Resp: 18    SpO2: 96%    Weight: 95 1 kg (209 lb 9 6 oz)    Height: 5' 11" (1 803 m)

## 2020-04-10 DIAGNOSIS — E78.2 MIXED HYPERLIPIDEMIA: ICD-10-CM

## 2020-04-10 RX ORDER — ATORVASTATIN CALCIUM 40 MG/1
TABLET, FILM COATED ORAL
Qty: 90 TABLET | Refills: 0 | Status: SHIPPED | OUTPATIENT
Start: 2020-04-10 | End: 2020-07-20

## 2020-04-25 DIAGNOSIS — I10 ESSENTIAL HYPERTENSION: ICD-10-CM

## 2020-04-26 RX ORDER — AMLODIPINE AND VALSARTAN 10; 160 MG/1; MG/1
TABLET ORAL
Qty: 90 TABLET | Refills: 1 | Status: SHIPPED | OUTPATIENT
Start: 2020-04-26 | End: 2020-07-20

## 2020-07-17 DIAGNOSIS — E78.2 MIXED HYPERLIPIDEMIA: ICD-10-CM

## 2020-07-19 DIAGNOSIS — I10 ESSENTIAL HYPERTENSION: ICD-10-CM

## 2020-07-20 DIAGNOSIS — F41.9 ANXIETY: ICD-10-CM

## 2020-07-20 LAB
ALBUMIN SERPL-MCNC: 4.3 G/DL (ref 3.6–5.1)
ALBUMIN/CREAT UR: 7 MCG/MG CREAT
ALBUMIN/GLOB SERPL: 1.7 (CALC) (ref 1–2.5)
ALP SERPL-CCNC: 51 U/L (ref 36–130)
ALT SERPL-CCNC: 25 U/L (ref 9–46)
AST SERPL-CCNC: 17 U/L (ref 10–40)
BASOPHILS # BLD AUTO: 23 CELLS/UL (ref 0–200)
BASOPHILS NFR BLD AUTO: 0.3 %
BILIRUB SERPL-MCNC: 0.5 MG/DL (ref 0.2–1.2)
BUN SERPL-MCNC: 19 MG/DL (ref 7–25)
BUN/CREAT SERPL: NORMAL (CALC) (ref 6–22)
CALCIUM SERPL-MCNC: 9.1 MG/DL (ref 8.6–10.3)
CHLORIDE SERPL-SCNC: 108 MMOL/L (ref 98–110)
CHOLEST SERPL-MCNC: 173 MG/DL
CHOLEST/HDLC SERPL: 4.6 (CALC)
CO2 SERPL-SCNC: 28 MMOL/L (ref 20–32)
CREAT SERPL-MCNC: 0.97 MG/DL (ref 0.6–1.35)
CREAT UR-MCNC: 75 MG/DL (ref 20–320)
EOSINOPHIL # BLD AUTO: 156 CELLS/UL (ref 15–500)
EOSINOPHIL NFR BLD AUTO: 2 %
ERYTHROCYTE [DISTWIDTH] IN BLOOD BY AUTOMATED COUNT: 12.5 % (ref 11–15)
GLOBULIN SER CALC-MCNC: 2.5 G/DL (CALC) (ref 1.9–3.7)
GLUCOSE SERPL-MCNC: 96 MG/DL (ref 65–99)
HCT VFR BLD AUTO: 44.7 % (ref 38.5–50)
HDLC SERPL-MCNC: 38 MG/DL
HGB BLD-MCNC: 14.7 G/DL (ref 13.2–17.1)
LDLC SERPL CALC-MCNC: 113 MG/DL (CALC)
LYMPHOCYTES # BLD AUTO: 1849 CELLS/UL (ref 850–3900)
LYMPHOCYTES NFR BLD AUTO: 23.7 %
MCH RBC QN AUTO: 31.5 PG (ref 27–33)
MCHC RBC AUTO-ENTMCNC: 32.9 G/DL (ref 32–36)
MCV RBC AUTO: 95.7 FL (ref 80–100)
MICROALBUMIN UR-MCNC: 0.5 MG/DL
MONOCYTES # BLD AUTO: 632 CELLS/UL (ref 200–950)
MONOCYTES NFR BLD AUTO: 8.1 %
NEUTROPHILS # BLD AUTO: 5140 CELLS/UL (ref 1500–7800)
NEUTROPHILS NFR BLD AUTO: 65.9 %
NONHDLC SERPL-MCNC: 135 MG/DL (CALC)
PLATELET # BLD AUTO: 170 THOUSAND/UL (ref 140–400)
PMV BLD REES-ECKER: 10.4 FL (ref 7.5–12.5)
POTASSIUM SERPL-SCNC: 4.2 MMOL/L (ref 3.5–5.3)
PROT SERPL-MCNC: 6.8 G/DL (ref 6.1–8.1)
RBC # BLD AUTO: 4.67 MILLION/UL (ref 4.2–5.8)
SL AMB EGFR AFRICAN AMERICAN: 109 ML/MIN/1.73M2
SL AMB EGFR NON AFRICAN AMERICAN: 94 ML/MIN/1.73M2
SODIUM SERPL-SCNC: 142 MMOL/L (ref 135–146)
TRIGL SERPL-MCNC: 108 MG/DL
TSH SERPL-ACNC: 0.94 MIU/L (ref 0.4–4.5)
WBC # BLD AUTO: 7.8 THOUSAND/UL (ref 3.8–10.8)

## 2020-07-20 RX ORDER — AMLODIPINE AND VALSARTAN 10; 160 MG/1; MG/1
TABLET ORAL
Qty: 90 TABLET | Refills: 1 | Status: SHIPPED | OUTPATIENT
Start: 2020-07-20 | End: 2021-09-20 | Stop reason: SDUPTHER

## 2020-07-20 RX ORDER — BUPROPION HYDROCHLORIDE 300 MG/1
TABLET ORAL
Qty: 30 TABLET | Refills: 5 | Status: SHIPPED | OUTPATIENT
Start: 2020-07-20 | End: 2021-01-11

## 2020-07-20 RX ORDER — ATORVASTATIN CALCIUM 40 MG/1
TABLET, FILM COATED ORAL
Qty: 90 TABLET | Refills: 0 | Status: SHIPPED | OUTPATIENT
Start: 2020-07-20 | End: 2020-10-15

## 2020-07-28 ENCOUNTER — TELEPHONE (OUTPATIENT)
Dept: OTHER | Facility: OTHER | Age: 45
End: 2020-07-28

## 2020-07-28 ENCOUNTER — OFFICE VISIT (OUTPATIENT)
Dept: FAMILY MEDICINE CLINIC | Facility: CLINIC | Age: 45
End: 2020-07-28
Payer: COMMERCIAL

## 2020-07-28 VITALS
HEART RATE: 88 BPM | OXYGEN SATURATION: 98 % | DIASTOLIC BLOOD PRESSURE: 70 MMHG | TEMPERATURE: 98.7 F | RESPIRATION RATE: 16 BRPM | BODY MASS INDEX: 28.98 KG/M2 | WEIGHT: 207 LBS | SYSTOLIC BLOOD PRESSURE: 130 MMHG | HEIGHT: 71 IN

## 2020-07-28 DIAGNOSIS — E78.2 MIXED HYPERLIPIDEMIA: ICD-10-CM

## 2020-07-28 DIAGNOSIS — Z00.00 ANNUAL PHYSICAL EXAM: Primary | ICD-10-CM

## 2020-07-28 DIAGNOSIS — I10 ESSENTIAL HYPERTENSION: ICD-10-CM

## 2020-07-28 PROBLEM — Z23 NEED FOR VACCINATION FOR PNEUMOCOCCUS: Status: RESOLVED | Noted: 2018-06-19 | Resolved: 2020-07-28

## 2020-07-28 PROBLEM — G47.10 HYPERSOMNIA: Status: RESOLVED | Noted: 2018-04-04 | Resolved: 2020-07-28

## 2020-07-28 PROBLEM — R06.83 SNORING: Status: RESOLVED | Noted: 2018-02-13 | Resolved: 2020-07-28

## 2020-07-28 PROCEDURE — 3075F SYST BP GE 130 - 139MM HG: CPT | Performed by: FAMILY MEDICINE

## 2020-07-28 PROCEDURE — 3078F DIAST BP <80 MM HG: CPT | Performed by: FAMILY MEDICINE

## 2020-07-28 PROCEDURE — 99396 PREV VISIT EST AGE 40-64: CPT | Performed by: FAMILY MEDICINE

## 2020-07-28 NOTE — PROGRESS NOTES
ADULT ANNUAL [address]  [facility]    NAME: Ankur Arnold  AGE: 38 y o  SEX: male  : 1975     DATE: 2020     Assessment and Plan:     Problem List Items Addressed This Visit        Cardiovascular and Mediastinum    Essential hypertension    Relevant Orders    CBC and differential    Comprehensive metabolic panel  Hypertension is stable, he will continue amlodipine  And valsartan   continue low-salt diet       Other    Hyperlipidemia    Relevant Orders    CBC and differential    Comprehensive metabolic panel    Lipid panel   his lipid panel is slightly worse compared to last time as he reduced the atorvastatin 20 mg, he agrees ,will increase again to 40 mg    Annual physical exam - Primary   advised to get his dental exam regularly and he has keratoconus with sees eye doctor         he is up-to-date on immunization  Immunizations and preventive care screenings were discussed with patient today  Appropriate education was printed on patient's after visit summary  six-month follow-up advised    Counseling:  Dental Health: discussed importance of regular tooth brushing, flossing, and dental visits  · Exercise: the importance of regular exercise/physical activity was discussed  Recommend exercise 3-5 times per week for at least 30 minutes  Return in about 6 months (around 2021) for Recheck  Chief Complaint:     Chief Complaint   Patient presents with    Follow-up    Annual Exam      History of Present Illness:     Adult Annual Physical   Patient here for a comprehensive physical exam  The patient reports no problems  He use CPAP machine, asthma is under control, hypertension and hyperlipidemia are managed with medications and low-fat diet  He has been taking 20 mg atorvastatin since last visit and his trend of cholesterol is going up     anxiety under control with Wellbutrin    Diet and Physical Activity  · Diet/Nutrition: well balanced diet and low fat diet  · Exercise: moderate cardiovascular exercise  Depression Screening  PHQ-9 Depression Screening    PHQ-9:    Frequency of the following problems over the past two weeks:       Little interest or pleasure in doing things:  0 - not at all  Feeling down, depressed, or hopeless:  0 - not at all  PHQ-2 Score:  0       General Health  · Sleep: sleeps well  · Hearing: normal - bilateral   · Vision: needs glasses , will see ophthalmologist     · Dental: no dental visits for >1 year   Health  · Symptoms include: none     Review of Systems:     Review of Systems   Constitutional: Negative for activity change, appetite change, chills, diaphoresis, fatigue, fever and unexpected weight change  HENT: Negative for congestion, dental problem, ear discharge, ear pain, facial swelling, hearing loss, mouth sores, nosebleeds, postnasal drip, rhinorrhea, sinus pressure, sinus pain, sneezing, sore throat, trouble swallowing and voice change  Eyes: Negative for photophobia, pain, discharge, redness and itching  Respiratory: Negative for cough, chest tightness, shortness of breath and wheezing  Cardiovascular: Negative for chest pain, palpitations and leg swelling  Gastrointestinal: Negative for abdominal distention, abdominal pain, blood in stool, constipation, diarrhea and nausea  Endocrine: Negative for cold intolerance, heat intolerance, polydipsia, polyphagia and polyuria  Genitourinary: Negative for dysuria, flank pain, frequency, hematuria and urgency  Musculoskeletal: Negative for arthralgias, back pain, myalgias and neck pain  Skin: Negative for color change and pallor  Allergic/Immunologic: Negative for environmental allergies and food allergies  Neurological: Negative for dizziness, weakness, light-headedness, numbness and headaches  Hematological: Negative for adenopathy  Does not bruise/bleed easily     Psychiatric/Behavioral: Negative for behavioral problems, sleep disturbance and suicidal ideas  The patient is not nervous/anxious         Past Medical History:     Past Medical History:   Diagnosis Date    Benign essential hypertension 03/28/2006    Cellulitis of ankle     Hyperlipidemia 03/28/2006    Sting from hornet, wasp, or bee       Past Surgical History:     Past Surgical History:   Procedure Laterality Date    HERNIA REPAIR        Family History:     Family History   Problem Relation Age of Onset    Sleep apnea Father         Adult    Atrial fibrillation Father     Stroke Father       Social History:        Social History     Socioeconomic History    Marital status: Single     Spouse name: None    Number of children: None    Years of education: None    Highest education level: None   Occupational History    None   Social Needs    Financial resource strain: None    Food insecurity:     Worry: None     Inability: None    Transportation needs:     Medical: None     Non-medical: None   Tobacco Use    Smoking status: Former Smoker    Smokeless tobacco: Never Used   Substance and Sexual Activity    Alcohol use: Yes     Comment: Rare use    Drug use: No    Sexual activity: None   Lifestyle    Physical activity:     Days per week: None     Minutes per session: None    Stress: None   Relationships    Social connections:     Talks on phone: None     Gets together: None     Attends Religion service: None     Active member of club or organization: None     Attends meetings of clubs or organizations: None     Relationship status: None    Intimate partner violence:     Fear of current or ex partner: None     Emotionally abused: None     Physically abused: None     Forced sexual activity: None   Other Topics Concern    None   Social History Narrative    Marital history - currently       Current Medications:     Current Outpatient Medications   Medication Sig Dispense Refill    amLODIPine-valsartan (EXFORGE)  MG per tablet take 1 tablet by mouth once daily 90 tablet 1    aspirin 81 MG tablet Take 1 tablet by mouth daily      atorvastatin (LIPITOR) 40 mg tablet take 1 tablet by mouth once daily 90 tablet 0    buPROPion (WELLBUTRIN XL) 300 mg 24 hr tablet take 1 tablet by mouth once daily 30 tablet 5    PROAIR  (90 Base) MCG/ACT inhaler Inhale 2 puffs every 6 (six) hours as needed for wheezing 8 5 g 1    SYMBICORT 160-4 5 MCG/ACT inhaler Inhale 2 puffs 2 (two) times a day Rinse mouth after use  10 2 g 1     No current facility-administered medications for this visit  Allergies: Allergies   Allergen Reactions    Fish Oil     Oxycodone-Acetaminophen      Reaction Date: 12Jun2012;       Physical Exam:     /70   Pulse 88   Temp 98 7 °F (37 1 °C)   Resp 16   Ht 5' 11" (1 803 m)   Wt 93 9 kg (207 lb)   SpO2 98%   BMI 28 87 kg/m²     Physical Exam   Constitutional: He is oriented to person, place, and time  He appears well-developed and well-nourished  HENT:   Head: Normocephalic and atraumatic  Right Ear: External ear normal    Left Ear: External ear normal    Nose: Nose normal    Mouth/Throat: Oropharynx is clear and moist  No oropharyngeal exudate  Eyes: Pupils are equal, round, and reactive to light  Conjunctivae and EOM are normal  Right eye exhibits no discharge  Left eye exhibits no discharge  No scleral icterus  Neck: Normal range of motion  Neck supple  No JVD present  No tracheal deviation present  No thyromegaly present  Cardiovascular: Normal rate, regular rhythm and normal heart sounds  No murmur heard  Pulmonary/Chest: Effort normal and breath sounds normal  No respiratory distress  He has no wheezes  He has no rales  Abdominal: Soft  Bowel sounds are normal  He exhibits no distension and no mass  There is no tenderness  There is no rebound  Musculoskeletal: Normal range of motion  He exhibits no edema  Lymphadenopathy:     He has no cervical adenopathy     Neurological: He is alert and oriented to person, place, and time  He has normal reflexes  He displays normal reflexes  No cranial nerve deficit or sensory deficit  Coordination normal    Skin: Skin is warm  No rash noted  No erythema  No pallor  Psychiatric: He has a normal mood and affect  His behavior is normal  Judgment and thought content normal    Nursing note and vitals reviewed        Jacquelyn Aguilar MD  [place] [id number]

## 2020-07-28 NOTE — PATIENT INSTRUCTIONS

## 2020-08-10 ENCOUNTER — OFFICE VISIT (OUTPATIENT)
Dept: SLEEP CENTER | Facility: CLINIC | Age: 45
End: 2020-08-10
Payer: COMMERCIAL

## 2020-08-10 VITALS
DIASTOLIC BLOOD PRESSURE: 72 MMHG | SYSTOLIC BLOOD PRESSURE: 108 MMHG | WEIGHT: 211 LBS | BODY MASS INDEX: 29.54 KG/M2 | HEIGHT: 71 IN

## 2020-08-10 DIAGNOSIS — J45.20 MILD INTERMITTENT ASTHMA WITHOUT COMPLICATION: ICD-10-CM

## 2020-08-10 DIAGNOSIS — G47.33 OSA (OBSTRUCTIVE SLEEP APNEA): Primary | ICD-10-CM

## 2020-08-10 DIAGNOSIS — F41.9 ANXIETY: ICD-10-CM

## 2020-08-10 DIAGNOSIS — F45.8 AEROPHAGIA: ICD-10-CM

## 2020-08-10 DIAGNOSIS — I10 ESSENTIAL HYPERTENSION: ICD-10-CM

## 2020-08-10 DIAGNOSIS — E66.3 OVERWEIGHT WITH BODY MASS INDEX (BMI) OF 29 TO 29.9 IN ADULT: ICD-10-CM

## 2020-08-10 PROCEDURE — 3008F BODY MASS INDEX DOCD: CPT | Performed by: INTERNAL MEDICINE

## 2020-08-10 PROCEDURE — 3074F SYST BP LT 130 MM HG: CPT | Performed by: INTERNAL MEDICINE

## 2020-08-10 PROCEDURE — 1036F TOBACCO NON-USER: CPT | Performed by: INTERNAL MEDICINE

## 2020-08-10 PROCEDURE — 99214 OFFICE O/P EST MOD 30 MIN: CPT | Performed by: INTERNAL MEDICINE

## 2020-08-10 PROCEDURE — 3078F DIAST BP <80 MM HG: CPT | Performed by: INTERNAL MEDICINE

## 2020-08-10 NOTE — PROGRESS NOTES
Follow-Up Note - Latisha Estrada  38 y o  male  :1975  QNE:[id number]    CC: I saw this patient for follow-up in clinic today for Sleep disordered breathing, Coexisting Sleep and Medical Problems  PFSH, Problem List, Medications & Allergies were reviewed in EMR  Interval changes: none reported  He  has a past medical history of Benign essential hypertension (2006), Cellulitis of ankle, Hyperlipidemia (2006), and Sting from hornet, wasp, or bee  He has a current medication list which includes the following prescription(s): amlodipine-valsartan, aspirin, atorvastatin, bupropion, proair hfa, and symbicort  ROS: constitutional, psychiatric, ENT, respiratory,CVS, GI, UGS, CNS, MSK, integumentary, endocrine, hematological reviewed  Significant for some weight gain  Allergies, asthma and anxiety are controlled  Anai Prows DATA REVIEWED:  using PAP > 4 hours/night 93% of the time  Estimated RL 0 6/hour at pressure of 14 2cm H2O @90th percentile  SUBJECTIVE: Regarding use of PAP, Harjit Huerta reports:   · He is experiencing minor adverse effects: abdominal bloating or discomfort  · He is   benefiting from use: sleeping better and no longer snoring / having breathing difficulties   Sleep Routine: He reports getting 7 hrs sleep on week nights and more on weekends; he has no difficulty initiating or maintaining sleep   He awakens with aid of an alarm and feels refreshed  He denied excessive drowsiness   He rated himself at Total score: 1 /24 on the [place] sleepiness scale  Habits: reports that he has quit smoking  He has never used smokeless tobacco ,  reports current alcohol use ,  reports no history of drug use , Caffeine use: limited , Exercise routine: regular    EXAM: /72   Ht 5' 11" (1 803 m)   Wt 95 7 kg (211 lb)   BMI 29 43 kg/m²     Patient is well groomed; well appearing  Skin/Extrem: warm & dry; col & hydration normal; no edema  Psych: cooperativeand in no distress  Mental state appears normal   CNS: Alert, orientated, clear & coherent speech  H&N: EOMI; NC/AT:no facial pressure marks, no rashes  cm    ENMT Mucus membranes appear normal Nasal airway:patent  Oral airway:  crowded  Resp:effort is normal CVS: RRR ABD:truncal obesity MSK:Gait normal     IMPRESSION: Primary Sleep/Secondary(to Medical or Psych conditions) & comorbidities   1  JOSEE (obstructive sleep apnea)  PAP DME Resupply/Reorder   2  Aerophagia     3  Mild intermittent asthma without complication     4  Anxiety     5  Essential hypertension     6  Overweight with body mass index (BMI) of 29 to 29 9 in adult         PLAN:  1  Treatment with  PAP is medically necessary and Clarissa Bridegroom is agreable to continue use  2  Care of equipment, methods to improve comfort using PAP and importance of compliance with therapy were discussed  3  Pressure setting: continue 13-17 cmH2O     4  Rx provided to replace supplies and Care coordinated with DME provider  5  Strategies for weight reduction were discussed  6  Follow-up is advised in 1 year or sooner if needed to monitor progress, compliance and to adjust therapy  Thank you for allowing me to participate in the care of this patient      Sincerely,    Authenticated electronically by Tere Delgado MD on 72/66/76   Board Certified Specialist

## 2020-08-10 NOTE — PATIENT INSTRUCTIONS

## 2020-08-11 ENCOUNTER — TELEPHONE (OUTPATIENT)
Dept: SLEEP CENTER | Facility: CLINIC | Age: 45
End: 2020-08-11

## 2020-10-11 DIAGNOSIS — J45.20 MILD INTERMITTENT ASTHMA WITHOUT COMPLICATION: ICD-10-CM

## 2020-10-12 RX ORDER — BUDESONIDE AND FORMOTEROL FUMARATE DIHYDRATE 160; 4.5 UG/1; UG/1
AEROSOL RESPIRATORY (INHALATION)
Qty: 10.2 G | Refills: 1 | Status: SHIPPED | OUTPATIENT
Start: 2020-10-12 | End: 2021-06-17

## 2020-10-15 DIAGNOSIS — E78.2 MIXED HYPERLIPIDEMIA: ICD-10-CM

## 2020-10-15 RX ORDER — ATORVASTATIN CALCIUM 40 MG/1
TABLET, FILM COATED ORAL
Qty: 90 TABLET | Refills: 0 | Status: SHIPPED | OUTPATIENT
Start: 2020-10-15 | End: 2021-01-15

## 2020-11-18 DIAGNOSIS — J45.20 MILD INTERMITTENT ASTHMA WITHOUT COMPLICATION: ICD-10-CM

## 2020-11-29 RX ORDER — ALBUTEROL SULFATE 90 UG/1
AEROSOL, METERED RESPIRATORY (INHALATION)
Qty: 8.5 G | Refills: 1 | Status: SHIPPED | OUTPATIENT
Start: 2020-11-29 | End: 2022-06-28

## 2021-01-11 DIAGNOSIS — F41.9 ANXIETY: ICD-10-CM

## 2021-01-11 RX ORDER — BUPROPION HYDROCHLORIDE 300 MG/1
TABLET ORAL
Qty: 30 TABLET | Refills: 5 | Status: SHIPPED | OUTPATIENT
Start: 2021-01-11 | End: 2021-07-19

## 2021-01-15 DIAGNOSIS — E78.2 MIXED HYPERLIPIDEMIA: ICD-10-CM

## 2021-01-15 RX ORDER — ATORVASTATIN CALCIUM 40 MG/1
TABLET, FILM COATED ORAL
Qty: 90 TABLET | Refills: 0 | Status: SHIPPED | OUTPATIENT
Start: 2021-01-15 | End: 2021-04-19

## 2021-02-05 LAB
ALBUMIN SERPL-MCNC: 4.5 G/DL (ref 3.6–5.1)
ALBUMIN/GLOB SERPL: 1.9 (CALC) (ref 1–2.5)
ALP SERPL-CCNC: 47 U/L (ref 36–130)
ALT SERPL-CCNC: 25 U/L (ref 9–46)
AST SERPL-CCNC: 17 U/L (ref 10–40)
BASOPHILS # BLD AUTO: 23 CELLS/UL (ref 0–200)
BASOPHILS NFR BLD AUTO: 0.4 %
BILIRUB SERPL-MCNC: 0.5 MG/DL (ref 0.2–1.2)
BUN SERPL-MCNC: 17 MG/DL (ref 7–25)
BUN/CREAT SERPL: ABNORMAL (CALC) (ref 6–22)
CALCIUM SERPL-MCNC: 9.6 MG/DL (ref 8.6–10.3)
CHLORIDE SERPL-SCNC: 107 MMOL/L (ref 98–110)
CHOLEST SERPL-MCNC: 166 MG/DL
CHOLEST/HDLC SERPL: 4.4 (CALC)
CO2 SERPL-SCNC: 30 MMOL/L (ref 20–32)
CREAT SERPL-MCNC: 0.9 MG/DL (ref 0.6–1.35)
EOSINOPHIL # BLD AUTO: 154 CELLS/UL (ref 15–500)
EOSINOPHIL NFR BLD AUTO: 2.7 %
ERYTHROCYTE [DISTWIDTH] IN BLOOD BY AUTOMATED COUNT: 12.1 % (ref 11–15)
GLOBULIN SER CALC-MCNC: 2.4 G/DL (CALC) (ref 1.9–3.7)
GLUCOSE SERPL-MCNC: 101 MG/DL (ref 65–99)
HCT VFR BLD AUTO: 45.5 % (ref 38.5–50)
HDLC SERPL-MCNC: 38 MG/DL
HGB BLD-MCNC: 15 G/DL (ref 13.2–17.1)
LDLC SERPL CALC-MCNC: 111 MG/DL (CALC)
LYMPHOCYTES # BLD AUTO: 1693 CELLS/UL (ref 850–3900)
LYMPHOCYTES NFR BLD AUTO: 29.7 %
MCH RBC QN AUTO: 31.7 PG (ref 27–33)
MCHC RBC AUTO-ENTMCNC: 33 G/DL (ref 32–36)
MCV RBC AUTO: 96.2 FL (ref 80–100)
MONOCYTES # BLD AUTO: 633 CELLS/UL (ref 200–950)
MONOCYTES NFR BLD AUTO: 11.1 %
NEUTROPHILS # BLD AUTO: 3198 CELLS/UL (ref 1500–7800)
NEUTROPHILS NFR BLD AUTO: 56.1 %
NONHDLC SERPL-MCNC: 128 MG/DL (CALC)
PLATELET # BLD AUTO: 187 THOUSAND/UL (ref 140–400)
PMV BLD REES-ECKER: 10.8 FL (ref 7.5–12.5)
POTASSIUM SERPL-SCNC: 4.4 MMOL/L (ref 3.5–5.3)
PROT SERPL-MCNC: 6.9 G/DL (ref 6.1–8.1)
RBC # BLD AUTO: 4.73 MILLION/UL (ref 4.2–5.8)
SL AMB EGFR AFRICAN AMERICAN: 118 ML/MIN/1.73M2
SL AMB EGFR NON AFRICAN AMERICAN: 102 ML/MIN/1.73M2
SODIUM SERPL-SCNC: 142 MMOL/L (ref 135–146)
TRIGL SERPL-MCNC: 81 MG/DL
WBC # BLD AUTO: 5.7 THOUSAND/UL (ref 3.8–10.8)

## 2021-03-09 ENCOUNTER — TELEMEDICINE (OUTPATIENT)
Dept: FAMILY MEDICINE CLINIC | Facility: CLINIC | Age: 46
End: 2021-03-09
Payer: COMMERCIAL

## 2021-03-09 DIAGNOSIS — G47.33 OSA (OBSTRUCTIVE SLEEP APNEA): ICD-10-CM

## 2021-03-09 DIAGNOSIS — F41.9 ANXIETY: ICD-10-CM

## 2021-03-09 DIAGNOSIS — I10 ESSENTIAL HYPERTENSION: ICD-10-CM

## 2021-03-09 DIAGNOSIS — J45.30 MILD PERSISTENT ASTHMA WITHOUT COMPLICATION: ICD-10-CM

## 2021-03-09 DIAGNOSIS — R73.01 IMPAIRED FASTING GLUCOSE: Primary | ICD-10-CM

## 2021-03-09 DIAGNOSIS — E78.2 MIXED HYPERLIPIDEMIA: ICD-10-CM

## 2021-03-09 PROCEDURE — 1036F TOBACCO NON-USER: CPT | Performed by: FAMILY MEDICINE

## 2021-03-09 PROCEDURE — 99214 OFFICE O/P EST MOD 30 MIN: CPT | Performed by: FAMILY MEDICINE

## 2021-03-09 PROCEDURE — 3725F SCREEN DEPRESSION PERFORMED: CPT | Performed by: FAMILY MEDICINE

## 2021-03-09 NOTE — PROGRESS NOTES
Virtual Regular Visit      Assessment/Plan:    Problem List Items Addressed This Visit        Endocrine    Impaired fasting glucose - Primary    Relevant Orders    Hemoglobin A1C  Slightly elevated fasting blood sugar, discussed about continue increasing exercise and eating healthy diet, he already eating low carb diet  Follow-up in 6 month       Respiratory    JOSEE (obstructive sleep apnea)  He use CPAP machine sleeps well    Asthma  He use Symbicort only 1 puff daily and asthma is stable       Cardiovascular and Mediastinum    Essential hypertension    Relevant Orders    CBC and differential    Comprehensive metabolic panel    Microalbumin / creatinine urine ratio  , continue same medications       Other    Hyperlipidemia    Relevant Orders    Lipid panel    TSH, 3rd generation  Continue Lipitor and recheck labs in 6 months    Anxiety  Anxiety is stable, will continue Wellbutrin          BMI Counseling: There is no height or weight on file to calculate BMI  The BMI is above normal  Nutrition recommendations include decreasing portion sizes, encouraging healthy choices of fruits and vegetables, consuming healthier snacks, limiting drinks that contain sugar and moderation in carbohydrate intake  Exercise recommendations include exercising 3-5 times per week and strength training exercises  labs and f/u 6 months     Reason for visit is   Chief Complaint   Patient presents with    Virtual Regular Visit        Encounter provider Marlee Durant MD    Provider located at [address] [place] [id number]      Recent Visits  No visits were found meeting these conditions     Showing recent visits within past 7 days and meeting all other requirements     Today's Visits  Date Type Provider Dept   03/09/21 Telemedicine MD Chris Emery   Showing today's visits and meeting all other requirements     Future Appointments  No visits were found meeting these conditions  Showing future appointments within next 150 days and meeting all other requirements        The patient was identified by name and date of birth  Alexandro Castañeda was informed that this is a telemedicine visit and that the visit is being conducted through [facility] and patient was informed that this is a secure, HIPAA-compliant platform  He agrees to proceed     My office door was closed  No one else was in the room  He acknowledged consent and understanding of privacy and security of the video platform  The patient has agreed to participate and understands they can discontinue the visit at any time  Patient is aware this is a billable service  Subjective  Alexandro Castañeda is a 54 y o  male follow-up on his labs, he has anxiety which is stable on Wellbutrin, he is doing well he says he eats no carbs and he eats more meat and fruit vegetable, he does 3 mi walking every day, he says he gained 3-4 lb, overall he is fine, he use Symbicort only 1 time daily and his asthma has been stable,  He takes Lipitor for his hyperlipidemia     Hypertension controlled with the medication and he does not need any refills today          Past Medical History:   Diagnosis Date    Benign essential hypertension 03/28/2006    Cellulitis of ankle     Hyperlipidemia 03/28/2006    Sting from hornet, wasp, or bee        Past Surgical History:   Procedure Laterality Date    HERNIA REPAIR         Current Outpatient Medications   Medication Sig Dispense Refill    albuterol (PROVENTIL HFA,VENTOLIN HFA) 90 mcg/act inhaler inhale 2 puffs by mouth every 6 hours if needed for wheezing 8 5 g 1    amLODIPine-valsartan (EXFORGE)  MG per tablet take 1 tablet by mouth once daily 90 tablet 1    aspirin 81 MG tablet Take 1 tablet by mouth daily      atorvastatin (LIPITOR) 40 mg tablet take 1 tablet by mouth once daily 90 tablet 0    buPROPion (WELLBUTRIN XL) 300 mg 24 hr tablet take 1 tablet by mouth once daily 30 tablet 5    Symbicort 160-4 5 MCG/ACT inhaler inhale 2 puffs by mouth twice a day Rinse mouth after use 10 2 g 1     No current facility-administered medications for this visit  Allergies   Allergen Reactions    Fish Oil     Oxycodone-Acetaminophen      Reaction Date: 12Jun2012;        Review of Systems   Constitutional: Negative for activity change, appetite change, chills, fatigue, fever and unexpected weight change  HENT: Negative for congestion, ear discharge, ear pain, nosebleeds, postnasal drip, rhinorrhea, sinus pressure, sneezing, sore throat, trouble swallowing and voice change  Eyes: Negative for photophobia, pain, discharge, redness and itching  Respiratory: Negative for cough, chest tightness, shortness of breath and wheezing  Cardiovascular: Negative for chest pain, palpitations and leg swelling  Gastrointestinal: Negative for abdominal pain, constipation, diarrhea, nausea and vomiting  Endocrine: Negative for polyuria  Genitourinary: Negative for dysuria, frequency and urgency  Musculoskeletal: Negative for arthralgias, back pain, myalgias and neck pain  Skin: Negative for color change, pallor and rash  Allergic/Immunologic: Negative for environmental allergies and food allergies  Neurological: Negative for dizziness, weakness, light-headedness and headaches  Hematological: Negative for adenopathy  Does not bruise/bleed easily  Psychiatric/Behavioral: Negative for behavioral problems  The patient is not nervous/anxious  Video Exam    There were no vitals filed for this visit  Physical Exam  Constitutional:       Appearance: Normal appearance  HENT:      Head: Normocephalic  Eyes:      Extraocular Movements: Extraocular movements intact  Neck:      Musculoskeletal: Normal range of motion  Pulmonary:      Effort: Pulmonary effort is normal  No respiratory distress  Skin:     Coloration: Skin is not jaundiced or pale     Neurological: Mental Status: He is alert  Psychiatric:         Mood and Affect: Mood normal          Behavior: Behavior normal          Thought Content: Thought content normal           I spent 20 minutes directly with the patient during this visit      Jg Adan acknowledges that he has consented to an online visit or consultation  He understands that the online visit is based solely on information provided by him, and that, in the absence of a face-to-face physical evaluation by the physician, the diagnosis he receives is both limited and provisional in terms of accuracy and completeness  This is not intended to replace a full medical face-to-face evaluation by the physician  Jordy Johnson understands and accepts these terms

## 2021-04-16 DIAGNOSIS — E78.2 MIXED HYPERLIPIDEMIA: ICD-10-CM

## 2021-04-19 RX ORDER — ATORVASTATIN CALCIUM 40 MG/1
TABLET, FILM COATED ORAL
Qty: 90 TABLET | Refills: 1 | Status: SHIPPED | OUTPATIENT
Start: 2021-04-19 | End: 2021-10-15

## 2021-06-16 DIAGNOSIS — J45.20 MILD INTERMITTENT ASTHMA WITHOUT COMPLICATION: ICD-10-CM

## 2021-06-17 RX ORDER — BUDESONIDE AND FORMOTEROL FUMARATE DIHYDRATE 160; 4.5 UG/1; UG/1
AEROSOL RESPIRATORY (INHALATION)
Qty: 10.2 G | Refills: 1 | Status: SHIPPED | OUTPATIENT
Start: 2021-06-17 | End: 2022-04-29

## 2021-07-19 DIAGNOSIS — F41.9 ANXIETY: ICD-10-CM

## 2021-07-19 RX ORDER — BUPROPION HYDROCHLORIDE 300 MG/1
TABLET ORAL
Qty: 30 TABLET | Refills: 5 | Status: SHIPPED | OUTPATIENT
Start: 2021-07-19 | End: 2022-03-21

## 2021-08-11 ENCOUNTER — OFFICE VISIT (OUTPATIENT)
Dept: SLEEP CENTER | Facility: CLINIC | Age: 46
End: 2021-08-11
Payer: COMMERCIAL

## 2021-08-11 VITALS
HEART RATE: 82 BPM | WEIGHT: 216 LBS | SYSTOLIC BLOOD PRESSURE: 120 MMHG | BODY MASS INDEX: 30.24 KG/M2 | DIASTOLIC BLOOD PRESSURE: 74 MMHG | HEIGHT: 71 IN

## 2021-08-11 DIAGNOSIS — E66.9 OBESITY (BMI 30-39.9): ICD-10-CM

## 2021-08-11 DIAGNOSIS — G47.33 OSA (OBSTRUCTIVE SLEEP APNEA): Primary | ICD-10-CM

## 2021-08-11 DIAGNOSIS — J45.20 MILD INTERMITTENT ASTHMA WITHOUT COMPLICATION: ICD-10-CM

## 2021-08-11 DIAGNOSIS — F45.8 AEROPHAGIA: ICD-10-CM

## 2021-08-11 DIAGNOSIS — F41.9 ANXIETY: ICD-10-CM

## 2021-08-11 DIAGNOSIS — I10 ESSENTIAL HYPERTENSION: ICD-10-CM

## 2021-08-11 PROCEDURE — 3078F DIAST BP <80 MM HG: CPT | Performed by: INTERNAL MEDICINE

## 2021-08-11 PROCEDURE — 99214 OFFICE O/P EST MOD 30 MIN: CPT | Performed by: INTERNAL MEDICINE

## 2021-08-11 PROCEDURE — 3074F SYST BP LT 130 MM HG: CPT | Performed by: INTERNAL MEDICINE

## 2021-08-11 PROCEDURE — 1036F TOBACCO NON-USER: CPT | Performed by: INTERNAL MEDICINE

## 2021-08-11 PROCEDURE — 3008F BODY MASS INDEX DOCD: CPT | Performed by: INTERNAL MEDICINE

## 2021-08-11 NOTE — PROGRESS NOTES
Follow-Up Note - Latisha Estrada  54 y o  male  OIA:[birth year]  DRI:[id number]  DZN:7/12/3208    CC: I saw this patient for follow-up in clinic today for Sleep disordered breathing, Coexisting Sleep and Medical Problems  Has a [employer] 1 machine  He is aware of the recall and is using the machine with a bacterial filter  Results of prior studies in 2018: A diagnostic study demonstrated an AHI of 35 / hour, higher while supine with minimum oxygen saturation of 83% and he spent 26% of total sleep time with saturations less than 90%  There were also frequent spontaneous arousals: Index of 75 / hour  During the subsequent therapeutic study, sleep disordered breathing was sufficiently remediated with PAP at 16 cm H2O  He was observed during stage REM but not while supine  PFSH, Problem List, Medications & Allergies were reviewed in EMR  Interval changes: none reported  He  has a past medical history of Benign essential hypertension (03/28/2006), Cellulitis of ankle, Hyperlipidemia (03/28/2006), and Sting from hornet, wasp, or bee  He has a current medication list which includes the following prescription(s): albuterol, amlodipine-valsartan, aspirin, atorvastatin, bupropion, and symbicort  PHYSIOLOGICAL DATA REVIEW AND INTERPRETATION:   using PAP > 4 hours/night 97%  Estimated RL 0 5/hour with pressure of 16 8cm H2O @90th percentile; Compliance: excellent; Sleep disordered breathing:stable & within target range; Patient has not been using ozone based or other devices to sanitize the machine  SUBJECTIVE: Regarding use of PAP, Tita Clement reports:   · He is experiencing minor adverse effects:  Aerophagia and now occurring much less frequently since pressure was reduced ; has not noticed any fibres or foreign material in air line       · He is benefiting from use: sleeping better   Sleep Routine: Tita Clement reports getting 7-8 hrs sleep  ; he has no difficulty initiating or maintaining sleep   He arises with aid of an alarm and feels refreshed  Connie Rosen denies Excessive Daytime Sleepiness,  and rated himself at Total score: 1 /24 on the [place] Sleepiness Scale  Habits: reports that he has quit smoking  He has never used smokeless tobacco ,  reports current alcohol use ,  reports no history of drug use , Caffeine use: limited , Exercise routine: regular    ROS: as attached  Significant for few lb weight gain since his last visit; he reported no nasal, respiratory or cardiac symptoms  Mood is stable on current medication  EXAM: /74   Pulse 82   Ht 5' 11" (1 803 m)   Wt 98 kg (216 lb)   BMI 30 13 kg/m²     Patient is well groomed; well appearing  H&N: EOMI; NC/AT:no facial pressure marks, no rashes  Skin/Extrem: col & hydration normal; no edema  Psych: cooperativeand in no distress  Mental state:appears normal   Resp: Respiratory effort is normal  CNS: Alert, orientated, clear & coherent speech  Physical findings otherwise essentially unchanged from previous  IMPRESSION: Problem List Items & Comorbidities Addressed this Visit    1  JOSEE (obstructive sleep apnea)  PAP DME Resupply/Reorder   2  Aerophagia     3  Mild intermittent asthma without complication     4  Anxiety     5  Essential hypertension     6  Obesity (BMI 30-39  9)         PLAN:  I reviewed results of prior studies and physiologic data with the patient  Notified regarding recall of [employer] devices and the recommendation to discontinue use pending resolution of degradation of the foam by replacing it  I discussed treatment options with risks and benefits  Patient understands risks of discontinuing PAP vs continuing use while awaiting resolution of the new issue  Patient have registered machine with [employer]  Advised to follow progress on their website, including web sites of DME provider, [facility] and [facility] for notifications     Treatment is medically necessary and patient  elected to continue using Pap while awaiting remediation  In the interim to also use an in line biological filter as suggested by Sandi Sommer  Care of equipment, methods to improve comfort using PAP and importance of compliance with therapy were discussed  Instructed not to use [employer] Clean or other cleaning devices unless approved by  a and FDA  Pressure setting:  Continue 13-17 cmH2O  Rx provided to replace  supplies and Care coordinated with DME provider  Discussed strategies for weight reduction  Follow-up is advised in 1 year or sooner if needed to monitor progress, compliance and to adjust therapy  Thank you for allowing me to participate in the care of this patient  Sincerely,    Authenticated electronically by Mike Alexandre MD on 72/79/71   Board Certified Specialist     Portions of the record may have been created with voice recognition software  Occasional wrong word or "sound a like" substitutions may have occurred due to the inherent limitations of voice recognition software  There may also be notations and random deletions of words or characters from malfunctioning software  Read the chart carefully and recognize, using context, where substitutions/deletions have occurred

## 2021-08-11 NOTE — PATIENT INSTRUCTIONS

## 2021-08-12 ENCOUNTER — TELEPHONE (OUTPATIENT)
Dept: SLEEP CENTER | Facility: CLINIC | Age: 46
End: 2021-08-12

## 2021-09-19 LAB
ALBUMIN SERPL-MCNC: 4.1 G/DL (ref 3.6–5.1)
ALBUMIN/CREAT UR: 3 MCG/MG CREAT
ALBUMIN/GLOB SERPL: 1.8 (CALC) (ref 1–2.5)
ALP SERPL-CCNC: 51 U/L (ref 36–130)
ALT SERPL-CCNC: 22 U/L (ref 9–46)
AST SERPL-CCNC: 15 U/L (ref 10–40)
BASOPHILS # BLD AUTO: 17 CELLS/UL (ref 0–200)
BASOPHILS NFR BLD AUTO: 0.3 %
BILIRUB SERPL-MCNC: 0.5 MG/DL (ref 0.2–1.2)
BUN SERPL-MCNC: 15 MG/DL (ref 7–25)
BUN/CREAT SERPL: ABNORMAL (CALC) (ref 6–22)
CALCIUM SERPL-MCNC: 8.8 MG/DL (ref 8.6–10.3)
CHLORIDE SERPL-SCNC: 105 MMOL/L (ref 98–110)
CHOLEST SERPL-MCNC: 155 MG/DL
CHOLEST/HDLC SERPL: 4.7 (CALC)
CO2 SERPL-SCNC: 27 MMOL/L (ref 20–32)
CREAT SERPL-MCNC: 0.92 MG/DL (ref 0.6–1.35)
CREAT UR-MCNC: 107 MG/DL (ref 20–320)
EOSINOPHIL # BLD AUTO: 122 CELLS/UL (ref 15–500)
EOSINOPHIL NFR BLD AUTO: 2.1 %
ERYTHROCYTE [DISTWIDTH] IN BLOOD BY AUTOMATED COUNT: 12.8 % (ref 11–15)
GLOBULIN SER CALC-MCNC: 2.3 G/DL (CALC) (ref 1.9–3.7)
GLUCOSE SERPL-MCNC: 110 MG/DL (ref 65–99)
HBA1C MFR BLD: 5.4 % OF TOTAL HGB
HCT VFR BLD AUTO: 42.6 % (ref 38.5–50)
HDLC SERPL-MCNC: 33 MG/DL
HGB BLD-MCNC: 14.5 G/DL (ref 13.2–17.1)
LDLC SERPL CALC-MCNC: 97 MG/DL (CALC)
LYMPHOCYTES # BLD AUTO: 2105 CELLS/UL (ref 850–3900)
LYMPHOCYTES NFR BLD AUTO: 36.3 %
MCH RBC QN AUTO: 31.9 PG (ref 27–33)
MCHC RBC AUTO-ENTMCNC: 34 G/DL (ref 32–36)
MCV RBC AUTO: 93.8 FL (ref 80–100)
MICROALBUMIN UR-MCNC: 0.3 MG/DL
MONOCYTES # BLD AUTO: 452 CELLS/UL (ref 200–950)
MONOCYTES NFR BLD AUTO: 7.8 %
NEUTROPHILS # BLD AUTO: 3103 CELLS/UL (ref 1500–7800)
NEUTROPHILS NFR BLD AUTO: 53.5 %
NONHDLC SERPL-MCNC: 122 MG/DL (CALC)
PLATELET # BLD AUTO: 183 THOUSAND/UL (ref 140–400)
PMV BLD REES-ECKER: 10.8 FL (ref 7.5–12.5)
POTASSIUM SERPL-SCNC: 4.1 MMOL/L (ref 3.5–5.3)
PROT SERPL-MCNC: 6.4 G/DL (ref 6.1–8.1)
RBC # BLD AUTO: 4.54 MILLION/UL (ref 4.2–5.8)
SL AMB EGFR AFRICAN AMERICAN: 114 ML/MIN/1.73M2
SL AMB EGFR NON AFRICAN AMERICAN: 99 ML/MIN/1.73M2
SODIUM SERPL-SCNC: 137 MMOL/L (ref 135–146)
TRIGL SERPL-MCNC: 146 MG/DL
TSH SERPL-ACNC: 1.12 MIU/L (ref 0.4–4.5)
WBC # BLD AUTO: 5.8 THOUSAND/UL (ref 3.8–10.8)

## 2021-09-20 DIAGNOSIS — I10 ESSENTIAL HYPERTENSION: ICD-10-CM

## 2021-09-20 RX ORDER — AMLODIPINE AND VALSARTAN 10; 160 MG/1; MG/1
1 TABLET ORAL DAILY
Qty: 30 TABLET | Refills: 0 | Status: SHIPPED | OUTPATIENT
Start: 2021-09-20 | End: 2021-10-18

## 2021-10-04 ENCOUNTER — TELEPHONE (OUTPATIENT)
Dept: SLEEP CENTER | Facility: CLINIC | Age: 46
End: 2021-10-04

## 2021-10-06 ENCOUNTER — OFFICE VISIT (OUTPATIENT)
Dept: FAMILY MEDICINE CLINIC | Facility: CLINIC | Age: 46
End: 2021-10-06
Payer: COMMERCIAL

## 2021-10-06 VITALS
RESPIRATION RATE: 16 BRPM | HEIGHT: 71 IN | SYSTOLIC BLOOD PRESSURE: 116 MMHG | WEIGHT: 216 LBS | OXYGEN SATURATION: 98 % | HEART RATE: 90 BPM | BODY MASS INDEX: 30.24 KG/M2 | DIASTOLIC BLOOD PRESSURE: 70 MMHG

## 2021-10-06 DIAGNOSIS — E78.2 MIXED HYPERLIPIDEMIA: ICD-10-CM

## 2021-10-06 DIAGNOSIS — R73.01 IMPAIRED FASTING GLUCOSE: Primary | ICD-10-CM

## 2021-10-06 DIAGNOSIS — I10 ESSENTIAL HYPERTENSION: ICD-10-CM

## 2021-10-06 DIAGNOSIS — G47.33 OSA (OBSTRUCTIVE SLEEP APNEA): ICD-10-CM

## 2021-10-06 DIAGNOSIS — J45.30 MILD PERSISTENT ASTHMA WITHOUT COMPLICATION: ICD-10-CM

## 2021-10-06 DIAGNOSIS — F41.9 ANXIETY: ICD-10-CM

## 2021-10-06 PROCEDURE — 3725F SCREEN DEPRESSION PERFORMED: CPT | Performed by: FAMILY MEDICINE

## 2021-10-06 PROCEDURE — 3078F DIAST BP <80 MM HG: CPT | Performed by: FAMILY MEDICINE

## 2021-10-06 PROCEDURE — 3008F BODY MASS INDEX DOCD: CPT | Performed by: FAMILY MEDICINE

## 2021-10-06 PROCEDURE — 99214 OFFICE O/P EST MOD 30 MIN: CPT | Performed by: FAMILY MEDICINE

## 2021-10-06 PROCEDURE — 1036F TOBACCO NON-USER: CPT | Performed by: FAMILY MEDICINE

## 2021-10-06 PROCEDURE — 3074F SYST BP LT 130 MM HG: CPT | Performed by: FAMILY MEDICINE

## 2021-10-15 DIAGNOSIS — E78.2 MIXED HYPERLIPIDEMIA: ICD-10-CM

## 2021-10-15 RX ORDER — ATORVASTATIN CALCIUM 40 MG/1
TABLET, FILM COATED ORAL
Qty: 90 TABLET | Refills: 1 | Status: SHIPPED | OUTPATIENT
Start: 2021-10-15 | End: 2022-04-19

## 2021-10-16 DIAGNOSIS — I10 ESSENTIAL HYPERTENSION: ICD-10-CM

## 2021-10-18 RX ORDER — AMLODIPINE AND VALSARTAN 10; 160 MG/1; MG/1
TABLET ORAL
Qty: 30 TABLET | Refills: 0 | Status: SHIPPED | OUTPATIENT
Start: 2021-10-18 | End: 2021-11-17

## 2021-11-17 DIAGNOSIS — I10 ESSENTIAL HYPERTENSION: ICD-10-CM

## 2021-11-17 RX ORDER — AMLODIPINE AND VALSARTAN 10; 160 MG/1; MG/1
TABLET ORAL
Qty: 90 TABLET | Refills: 3 | Status: SHIPPED | OUTPATIENT
Start: 2021-11-17

## 2021-12-28 DIAGNOSIS — Z20.822 EXPOSURE TO COVID-19 VIRUS: Primary | ICD-10-CM

## 2021-12-28 PROCEDURE — U0005 INFEC AGEN DETEC AMPLI PROBE: HCPCS | Performed by: FAMILY MEDICINE

## 2021-12-28 PROCEDURE — U0003 INFECTIOUS AGENT DETECTION BY NUCLEIC ACID (DNA OR RNA); SEVERE ACUTE RESPIRATORY SYNDROME CORONAVIRUS 2 (SARS-COV-2) (CORONAVIRUS DISEASE [COVID-19]), AMPLIFIED PROBE TECHNIQUE, MAKING USE OF HIGH THROUGHPUT TECHNOLOGIES AS DESCRIBED BY CMS-2020-01-R: HCPCS | Performed by: FAMILY MEDICINE

## 2022-03-21 DIAGNOSIS — F41.9 ANXIETY: ICD-10-CM

## 2022-03-21 RX ORDER — BUPROPION HYDROCHLORIDE 300 MG/1
TABLET ORAL
Qty: 30 TABLET | Refills: 5 | Status: SHIPPED | OUTPATIENT
Start: 2022-03-21

## 2022-04-03 LAB
ALBUMIN SERPL-MCNC: 4.2 G/DL (ref 3.6–5.1)
ALBUMIN/GLOB SERPL: 1.8 (CALC) (ref 1–2.5)
ALP SERPL-CCNC: 48 U/L (ref 36–130)
ALT SERPL-CCNC: 25 U/L (ref 9–46)
AST SERPL-CCNC: 17 U/L (ref 10–40)
BASOPHILS # BLD AUTO: 19 CELLS/UL (ref 0–200)
BASOPHILS NFR BLD AUTO: 0.3 %
BILIRUB SERPL-MCNC: 0.4 MG/DL (ref 0.2–1.2)
BUN SERPL-MCNC: 16 MG/DL (ref 7–25)
BUN/CREAT SERPL: ABNORMAL (CALC) (ref 6–22)
CALCIUM SERPL-MCNC: 9.1 MG/DL (ref 8.6–10.3)
CHLORIDE SERPL-SCNC: 105 MMOL/L (ref 98–110)
CHOLEST SERPL-MCNC: 169 MG/DL
CHOLEST/HDLC SERPL: 4.4 (CALC)
CO2 SERPL-SCNC: 31 MMOL/L (ref 20–32)
CREAT SERPL-MCNC: 1.03 MG/DL (ref 0.6–1.35)
EOSINOPHIL # BLD AUTO: 174 CELLS/UL (ref 15–500)
EOSINOPHIL NFR BLD AUTO: 2.8 %
ERYTHROCYTE [DISTWIDTH] IN BLOOD BY AUTOMATED COUNT: 12.5 % (ref 11–15)
GLOBULIN SER CALC-MCNC: 2.3 G/DL (CALC) (ref 1.9–3.7)
GLUCOSE SERPL-MCNC: 114 MG/DL (ref 65–99)
HBA1C MFR BLD: 5.5 % OF TOTAL HGB
HCT VFR BLD AUTO: 44.8 % (ref 38.5–50)
HDLC SERPL-MCNC: 38 MG/DL
HGB BLD-MCNC: 14.8 G/DL (ref 13.2–17.1)
LDLC SERPL CALC-MCNC: 103 MG/DL (CALC)
LYMPHOCYTES # BLD AUTO: 1947 CELLS/UL (ref 850–3900)
LYMPHOCYTES NFR BLD AUTO: 31.4 %
MCH RBC QN AUTO: 31.4 PG (ref 27–33)
MCHC RBC AUTO-ENTMCNC: 33 G/DL (ref 32–36)
MCV RBC AUTO: 95.1 FL (ref 80–100)
MONOCYTES # BLD AUTO: 626 CELLS/UL (ref 200–950)
MONOCYTES NFR BLD AUTO: 10.1 %
NEUTROPHILS # BLD AUTO: 3435 CELLS/UL (ref 1500–7800)
NEUTROPHILS NFR BLD AUTO: 55.4 %
NONHDLC SERPL-MCNC: 131 MG/DL (CALC)
PLATELET # BLD AUTO: 192 THOUSAND/UL (ref 140–400)
PMV BLD REES-ECKER: 10.8 FL (ref 7.5–12.5)
POTASSIUM SERPL-SCNC: 4 MMOL/L (ref 3.5–5.3)
PROT SERPL-MCNC: 6.5 G/DL (ref 6.1–8.1)
RBC # BLD AUTO: 4.71 MILLION/UL (ref 4.2–5.8)
SL AMB EGFR AFRICAN AMERICAN: 100 ML/MIN/1.73M2
SL AMB EGFR NON AFRICAN AMERICAN: 86 ML/MIN/1.73M2
SODIUM SERPL-SCNC: 140 MMOL/L (ref 135–146)
TRIGL SERPL-MCNC: 167 MG/DL
WBC # BLD AUTO: 6.2 THOUSAND/UL (ref 3.8–10.8)

## 2022-04-11 ENCOUNTER — OFFICE VISIT (OUTPATIENT)
Dept: FAMILY MEDICINE CLINIC | Facility: CLINIC | Age: 47
End: 2022-04-11
Payer: COMMERCIAL

## 2022-04-11 VITALS
WEIGHT: 221 LBS | HEART RATE: 83 BPM | SYSTOLIC BLOOD PRESSURE: 122 MMHG | OXYGEN SATURATION: 97 % | BODY MASS INDEX: 30.94 KG/M2 | DIASTOLIC BLOOD PRESSURE: 70 MMHG | HEIGHT: 71 IN | RESPIRATION RATE: 16 BRPM

## 2022-04-11 DIAGNOSIS — Z86.16 HISTORY OF COVID-19: ICD-10-CM

## 2022-04-11 DIAGNOSIS — R73.01 IMPAIRED FASTING GLUCOSE: ICD-10-CM

## 2022-04-11 DIAGNOSIS — J45.30 MILD PERSISTENT ASTHMA WITHOUT COMPLICATION: ICD-10-CM

## 2022-04-11 DIAGNOSIS — F41.9 ANXIETY: ICD-10-CM

## 2022-04-11 DIAGNOSIS — I10 ESSENTIAL HYPERTENSION: Primary | ICD-10-CM

## 2022-04-11 DIAGNOSIS — E78.2 MIXED HYPERLIPIDEMIA: ICD-10-CM

## 2022-04-11 PROCEDURE — 3008F BODY MASS INDEX DOCD: CPT | Performed by: FAMILY MEDICINE

## 2022-04-11 PROCEDURE — 3074F SYST BP LT 130 MM HG: CPT | Performed by: FAMILY MEDICINE

## 2022-04-11 PROCEDURE — 3725F SCREEN DEPRESSION PERFORMED: CPT | Performed by: FAMILY MEDICINE

## 2022-04-11 PROCEDURE — 1036F TOBACCO NON-USER: CPT | Performed by: FAMILY MEDICINE

## 2022-04-11 PROCEDURE — 99214 OFFICE O/P EST MOD 30 MIN: CPT | Performed by: FAMILY MEDICINE

## 2022-04-11 PROCEDURE — 3078F DIAST BP <80 MM HG: CPT | Performed by: FAMILY MEDICINE

## 2022-04-11 NOTE — ASSESSMENT & PLAN NOTE
HDL has improvement, the ratio of HDL to LDL is normal, he will continue Lipitor and low-fat diet and regular exercise

## 2022-04-11 NOTE — PROGRESS NOTES
Assessment/Plan:    Problem List Items Addressed This Visit        Endocrine    Impaired fasting glucose     He always have elevated fasting blood sugar but his A1c remains normal  Advised low carb diet  Lab Results   Component Value Date    HGBA1C 5 5 04/02/2022              Relevant Orders    Hemoglobin A1C       Respiratory    Asthma     He use Symbicort 1 puff daily at nighttime and has asthma is under control            Cardiovascular and Mediastinum    Essential hypertension - Primary     Blood pressure is under control continue same medication  Continue low-salt diet         Relevant Orders    CBC and differential    Comprehensive metabolic panel       Other    Hyperlipidemia     HDL has improvement, the ratio of HDL to LDL is normal, he will continue Lipitor and low-fat diet and regular exercise         Relevant Orders    Lipid panel    TSH, 3rd generation    Anxiety     Wellbutrin is working fine, continue same and anxiety under control         History of COVID-19     He says his vaccinated for COVID and he also got mild COVID infection and resolved completely               Return in about 6 months (around 10/11/2022) for Recheck  Chief Complaint   Patient presents with    Follow-up       Subjective:   Patient ID: Antonieta Corona is a 51 y o  male  Hypertension  This is a chronic problem  The problem is unchanged  The problem is controlled  Pertinent negatives include no anxiety, blurred vision, headaches, malaise/fatigue, neck pain, orthopnea, palpitations, peripheral edema or shortness of breath  There are no associated agents to hypertension  Risk factors for coronary artery disease include dyslipidemia and male gender  Past treatments include calcium channel blockers and angiotensin blockers  The current treatment provides significant improvement  There are no compliance problems  There is no history of kidney disease  Identifiable causes of hypertension include sleep apnea   There is no history of chronic renal disease or a thyroid problem  Anxiety is under control with Wellbutrin, hyperlipidemia he is on Lipitor, 40 mg, he says his asthma is under control he just take 1 puff of Symbicort daily, and does not need any refills  CPAP machine is working fine, he does regular exercise on treadmill    Review of Systems   Constitutional: Negative  Negative for malaise/fatigue  HENT: Negative  Eyes: Negative  Negative for blurred vision  Respiratory: Negative for shortness of breath  Cardiovascular: Negative for palpitations and orthopnea  Gastrointestinal: Negative  Endocrine: Negative  Musculoskeletal: Negative for neck pain  Skin: Negative  Neurological: Negative for headaches  Psychiatric/Behavioral: Negative  Objective:  Physical Exam  Vitals and nursing note reviewed  Constitutional:       Appearance: He is well-developed  He is not ill-appearing  HENT:      Head: Normocephalic and atraumatic  Right Ear: External ear normal       Left Ear: External ear normal       Nose: Nose normal  No congestion  Mouth/Throat:      Mouth: Mucous membranes are moist       Pharynx: No oropharyngeal exudate  Eyes:      General: No scleral icterus  Right eye: No discharge  Left eye: No discharge  Conjunctiva/sclera: Conjunctivae normal       Pupils: Pupils are equal, round, and reactive to light  Neck:      Thyroid: No thyromegaly  Trachea: No tracheal deviation  Cardiovascular:      Rate and Rhythm: Normal rate and regular rhythm  Heart sounds: Normal heart sounds  No murmur heard  Pulmonary:      Effort: Pulmonary effort is normal  No respiratory distress  Breath sounds: Normal breath sounds  No wheezing or rales  Abdominal:      General: Bowel sounds are normal  There is no distension  Palpations: Abdomen is soft  There is no mass  Tenderness: There is no abdominal tenderness  There is no rebound  Musculoskeletal:         General: Normal range of motion  Cervical back: Normal range of motion and neck supple  Right lower leg: No edema  Left lower leg: No edema  Lymphadenopathy:      Cervical: No cervical adenopathy  Skin:     General: Skin is warm  Coloration: Skin is not pale  Findings: No erythema or rash  Neurological:      Mental Status: He is alert and oriented to person, place, and time  Cranial Nerves: No cranial nerve deficit  Deep Tendon Reflexes: Reflexes are normal and symmetric  Psychiatric:         Behavior: Behavior normal          Thought Content:  Thought content normal          Judgment: Judgment normal             Past Surgical History:   Procedure Laterality Date    HERNIA REPAIR         Family History   Problem Relation Age of Onset    Sleep apnea Father         Adult    Atrial fibrillation Father     Stroke Father          Current Outpatient Medications:     albuterol (PROVENTIL HFA,VENTOLIN HFA) 90 mcg/act inhaler, inhale 2 puffs by mouth every 6 hours if needed for wheezing, Disp: 8 5 g, Rfl: 1    amLODIPine-valsartan (EXFORGE)  MG per tablet, take 1 tablet by mouth once daily, Disp: 90 tablet, Rfl: 3    aspirin 81 MG tablet, Take 1 tablet by mouth daily, Disp: , Rfl:     atorvastatin (LIPITOR) 40 mg tablet, take 1 tablet by mouth once daily, Disp: 90 tablet, Rfl: 1    buPROPion (WELLBUTRIN XL) 300 mg 24 hr tablet, take 1 tablet by mouth once daily, Disp: 30 tablet, Rfl: 5    Symbicort 160-4 5 MCG/ACT inhaler, inhale 2 puffs by mouth twice a day Rinse mouth after use, Disp: 10 2 g, Rfl: 1    Allergies   Allergen Reactions    Fish Oil - Food Allergy     Oxycodone-Acetaminophen      Reaction Date: 12Jun2012;        Vitals:    04/11/22 0809   BP: 122/70   Pulse: 83   Resp: 16   SpO2: 97%   Weight: 100 kg (221 lb)   Height: 5' 11" (1 803 m)

## 2022-04-11 NOTE — ASSESSMENT & PLAN NOTE
He always have elevated fasting blood sugar but his A1c remains normal  Advised low carb diet  Lab Results   Component Value Date    HGBA1C 5 5 04/02/2022

## 2022-04-19 DIAGNOSIS — E78.2 MIXED HYPERLIPIDEMIA: ICD-10-CM

## 2022-04-19 RX ORDER — ATORVASTATIN CALCIUM 40 MG/1
TABLET, FILM COATED ORAL
Qty: 90 TABLET | Refills: 1 | Status: SHIPPED | OUTPATIENT
Start: 2022-04-19

## 2022-04-29 DIAGNOSIS — J45.20 MILD INTERMITTENT ASTHMA WITHOUT COMPLICATION: ICD-10-CM

## 2022-04-29 RX ORDER — BUDESONIDE AND FORMOTEROL FUMARATE DIHYDRATE 160; 4.5 UG/1; UG/1
AEROSOL RESPIRATORY (INHALATION)
Qty: 10.2 G | Refills: 1 | Status: SHIPPED | OUTPATIENT
Start: 2022-04-29

## 2022-06-27 DIAGNOSIS — J45.20 MILD INTERMITTENT ASTHMA WITHOUT COMPLICATION: ICD-10-CM

## 2022-06-28 RX ORDER — ALBUTEROL SULFATE 90 UG/1
AEROSOL, METERED RESPIRATORY (INHALATION)
Qty: 8.5 G | Refills: 1 | Status: SHIPPED | OUTPATIENT
Start: 2022-06-28

## 2022-08-15 ENCOUNTER — OFFICE VISIT (OUTPATIENT)
Dept: SLEEP CENTER | Facility: CLINIC | Age: 47
End: 2022-08-15
Payer: COMMERCIAL

## 2022-08-15 VITALS
HEIGHT: 71 IN | WEIGHT: 213 LBS | DIASTOLIC BLOOD PRESSURE: 68 MMHG | BODY MASS INDEX: 29.82 KG/M2 | SYSTOLIC BLOOD PRESSURE: 126 MMHG

## 2022-08-15 DIAGNOSIS — E66.3 OVERWEIGHT (BMI 25.0-29.9): ICD-10-CM

## 2022-08-15 DIAGNOSIS — F45.8 AEROPHAGIA: ICD-10-CM

## 2022-08-15 DIAGNOSIS — I10 ESSENTIAL HYPERTENSION: ICD-10-CM

## 2022-08-15 DIAGNOSIS — G47.33 OSA (OBSTRUCTIVE SLEEP APNEA): Primary | ICD-10-CM

## 2022-08-15 DIAGNOSIS — F41.9 ANXIETY: ICD-10-CM

## 2022-08-15 DIAGNOSIS — J45.20 MILD INTERMITTENT ASTHMA WITHOUT COMPLICATION: ICD-10-CM

## 2022-08-15 PROCEDURE — 3074F SYST BP LT 130 MM HG: CPT | Performed by: INTERNAL MEDICINE

## 2022-08-15 PROCEDURE — 99214 OFFICE O/P EST MOD 30 MIN: CPT | Performed by: INTERNAL MEDICINE

## 2022-08-15 PROCEDURE — 3078F DIAST BP <80 MM HG: CPT | Performed by: INTERNAL MEDICINE

## 2022-08-15 NOTE — PATIENT INSTRUCTIONS

## 2022-08-15 NOTE — PROGRESS NOTES
Follow-Up Note - Latisha Estrada  51 y o  male  :1975  EJZ:[id number]  DOS:8/15/2022    CC: I saw this patient for follow-up in clinic today for Sleep disordered breathing, Coexisting Sleep and Medical Problems  He is using a dream Station version 2 machine that he got as a replacement from [employer] almost a year ago    Results of prior studies in 2018: A diagnostic study demonstrated an AHI of 35 / hour, higher while supine with minimum oxygen saturation of 83% and he spent 26% of total sleep time with saturations less than 90%  There were also frequent spontaneous arousals: Index of 75 / hour  During the subsequent therapeutic study, sleep disordered breathing was sufficiently remediated with PAP at 16 cm H2O  He was observed during stage REM but not while supine  PFSH, Problem List, Medications & Allergies were reviewed in EMR  Interval changes: none reported  He  has a past medical history of Benign essential hypertension (2006), Cellulitis of ankle, Hyperlipidemia (2006), and Sting from hornet, wasp, or bee  He has a current medication list which includes the following prescription(s): albuterol, amlodipine-valsartan, aspirin, atorvastatin, bupropion, and symbicort  PHYSIOLOGICAL DATA REVIEW AND INTERPRETATION:    using PAP > 4 hours/night 97%  Estimated RL 0 8/hour with pressure of 14cm H2O @90th/95th percentile; Patient has not been using ozone based devices to sanitize the machine  SUBJECTIVE: Regarding use of PAP, Perry Valenzuela reports:   · no adverse effects:  No longer experiencing abdominal bloating or discomfort; has not noticed any fibres or foreign material in air line  · He is benefiting from use: sleeping better   Sleep Routine: Perry Valenzuela reports getting 7 hrs sleep on work days and more on days off; he has no difficulty initiating or maintaining sleep   He arises needing an alarm and feels refreshed  Perry aVlenzuela denies Excessive Daytime Sleepiness,   He rated himself at Total score: 1 /24 on the McNeil Sleepiness Scale  Habits: reports that he has quit smoking  He has never used smokeless tobacco ,  reports current alcohol use ,  reports no history of drug use , Caffeine use:limited ; Exercise routine: regular    ROS: reviewed & as attached  Significant for some intentional weight reduction  Allergies, asthma and anxiety are controlled  Dara Soulier EXAM: /68   Ht 5' 11" (1 803 m)   Wt 96 6 kg (213 lb)   BMI 29 71 kg/m²     Wt Readings from Last 3 Encounters:   08/15/22 96 6 kg (213 lb)   04/11/22 100 kg (221 lb)   10/06/21 98 kg (216 lb)      Patient is well groomed; well appearing  CNS: Alert, orientated, clear & coherent speech  Psych: cooperativeand in no distress  Mental state:appears normal   H&N: EOMI; NC/AT:no facial pressure marks, no rashes  Skin/Extrem: col & hydration normal; no edema  Resp: Respiratory effort is normal  Physical findings otherwise essentially unchanged from previous  IMPRESSION: Problem List Items & Comorbidities Addressed this Visit    1  JOSEE (obstructive sleep apnea)  PAP DME Resupply/Reorder   2  Aerophagia     3  Mild intermittent asthma without complication     4  Anxiety     5  Essential hypertension     6  Overweight (BMI 25 0-29  9)       PLAN:  1  I reviewed results of prior studies and physiologic data with the patient  2  I discussed treatment options with risks and benefits  3  Treatment with  PAP is medically necessary and Eliana Peacock is agreable to continue use  4  Care of equipment, methods to improve comfort using PAP and importance of compliance with therapy were discussed  5  Pressure setting: continue 13-17 cmH2O     6  Rx provided to replace  supplies and Care coordinated with DME provider  7  Encouraged to persist with strategies for weight reduction  8  Follow-up is advised in 1 year or sooner if needed to monitor progress, compliance and to adjust therapy  Thank you for allowing me to participate in the care of this patient  Sincerely,     Authenticated electronically on 42/02/86   Board Certified Specialist     Portions of the record may have been created with voice recognition software  Occasional wrong word or "sound a like" substitutions may have occurred due to the inherent limitations of voice recognition software  There may also be notations and random deletions of words or characters from malfunctioning software  Read the chart carefully and recognize, using context, where substitutions/deletions have occurred

## 2022-08-16 ENCOUNTER — TELEPHONE (OUTPATIENT)
Dept: SLEEP CENTER | Facility: CLINIC | Age: 47
End: 2022-08-16

## 2022-09-28 DIAGNOSIS — F41.9 ANXIETY: ICD-10-CM

## 2022-09-28 RX ORDER — BUPROPION HYDROCHLORIDE 300 MG/1
TABLET ORAL
Qty: 30 TABLET | Refills: 5 | Status: SHIPPED | OUTPATIENT
Start: 2022-09-28

## 2022-10-03 DIAGNOSIS — I10 ESSENTIAL HYPERTENSION: ICD-10-CM

## 2022-10-03 RX ORDER — AMLODIPINE AND VALSARTAN 10; 160 MG/1; MG/1
1 TABLET ORAL DAILY
Qty: 90 TABLET | Refills: 0 | Status: SHIPPED | OUTPATIENT
Start: 2022-10-03

## 2022-10-09 LAB
ALBUMIN SERPL-MCNC: 4.2 G/DL (ref 3.6–5.1)
ALBUMIN/GLOB SERPL: 1.6 (CALC) (ref 1–2.5)
ALP SERPL-CCNC: 48 U/L (ref 36–130)
ALT SERPL-CCNC: 21 U/L (ref 9–46)
AST SERPL-CCNC: 16 U/L (ref 10–40)
BASOPHILS # BLD AUTO: 12 CELLS/UL (ref 0–200)
BASOPHILS NFR BLD AUTO: 0.2 %
BILIRUB SERPL-MCNC: 0.5 MG/DL (ref 0.2–1.2)
BUN SERPL-MCNC: 16 MG/DL (ref 7–25)
BUN/CREAT SERPL: NORMAL (CALC) (ref 6–22)
CALCIUM SERPL-MCNC: 9.1 MG/DL (ref 8.6–10.3)
CHLORIDE SERPL-SCNC: 105 MMOL/L (ref 98–110)
CHOLEST SERPL-MCNC: 171 MG/DL
CHOLEST/HDLC SERPL: 5 (CALC)
CO2 SERPL-SCNC: 30 MMOL/L (ref 20–32)
CREAT SERPL-MCNC: 1.02 MG/DL (ref 0.6–1.29)
EOSINOPHIL # BLD AUTO: 143 CELLS/UL (ref 15–500)
EOSINOPHIL NFR BLD AUTO: 2.3 %
ERYTHROCYTE [DISTWIDTH] IN BLOOD BY AUTOMATED COUNT: 12.4 % (ref 11–15)
GFR/BSA.PRED SERPLBLD CYS-BASED-ARV: 91 ML/MIN/1.73M2
GLOBULIN SER CALC-MCNC: 2.6 G/DL (CALC) (ref 1.9–3.7)
GLUCOSE SERPL-MCNC: 99 MG/DL (ref 65–99)
HBA1C MFR BLD: 5.3 % OF TOTAL HGB
HCT VFR BLD AUTO: 42.7 % (ref 38.5–50)
HDLC SERPL-MCNC: 34 MG/DL
HGB BLD-MCNC: 14.7 G/DL (ref 13.2–17.1)
LDLC SERPL CALC-MCNC: 109 MG/DL (CALC)
LYMPHOCYTES # BLD AUTO: 1972 CELLS/UL (ref 850–3900)
LYMPHOCYTES NFR BLD AUTO: 31.8 %
MCH RBC QN AUTO: 32.5 PG (ref 27–33)
MCHC RBC AUTO-ENTMCNC: 34.4 G/DL (ref 32–36)
MCV RBC AUTO: 94.5 FL (ref 80–100)
MONOCYTES # BLD AUTO: 601 CELLS/UL (ref 200–950)
MONOCYTES NFR BLD AUTO: 9.7 %
NEUTROPHILS # BLD AUTO: 3472 CELLS/UL (ref 1500–7800)
NEUTROPHILS NFR BLD AUTO: 56 %
NONHDLC SERPL-MCNC: 137 MG/DL (CALC)
PLATELET # BLD AUTO: 181 THOUSAND/UL (ref 140–400)
PMV BLD REES-ECKER: 11.2 FL (ref 7.5–12.5)
POTASSIUM SERPL-SCNC: 4.5 MMOL/L (ref 3.5–5.3)
PROT SERPL-MCNC: 6.8 G/DL (ref 6.1–8.1)
RBC # BLD AUTO: 4.52 MILLION/UL (ref 4.2–5.8)
SODIUM SERPL-SCNC: 141 MMOL/L (ref 135–146)
TRIGL SERPL-MCNC: 161 MG/DL
TSH SERPL-ACNC: 1.41 MIU/L (ref 0.4–4.5)
WBC # BLD AUTO: 6.2 THOUSAND/UL (ref 3.8–10.8)

## 2022-10-14 DIAGNOSIS — E78.2 MIXED HYPERLIPIDEMIA: ICD-10-CM

## 2022-10-17 ENCOUNTER — OFFICE VISIT (OUTPATIENT)
Dept: FAMILY MEDICINE CLINIC | Facility: CLINIC | Age: 47
End: 2022-10-17
Payer: COMMERCIAL

## 2022-10-17 VITALS
HEIGHT: 71 IN | SYSTOLIC BLOOD PRESSURE: 120 MMHG | OXYGEN SATURATION: 96 % | HEART RATE: 80 BPM | WEIGHT: 216 LBS | RESPIRATION RATE: 16 BRPM | BODY MASS INDEX: 30.24 KG/M2 | DIASTOLIC BLOOD PRESSURE: 72 MMHG

## 2022-10-17 DIAGNOSIS — E78.2 MIXED HYPERLIPIDEMIA: ICD-10-CM

## 2022-10-17 DIAGNOSIS — J45.30 MILD PERSISTENT ASTHMA WITHOUT COMPLICATION: ICD-10-CM

## 2022-10-17 DIAGNOSIS — F41.9 ANXIETY: ICD-10-CM

## 2022-10-17 DIAGNOSIS — I10 ESSENTIAL HYPERTENSION: ICD-10-CM

## 2022-10-17 DIAGNOSIS — Z12.11 SCREENING FOR COLON CANCER: Primary | ICD-10-CM

## 2022-10-17 DIAGNOSIS — G47.33 OSA (OBSTRUCTIVE SLEEP APNEA): ICD-10-CM

## 2022-10-17 PROCEDURE — 99214 OFFICE O/P EST MOD 30 MIN: CPT | Performed by: FAMILY MEDICINE

## 2022-10-17 RX ORDER — ATORVASTATIN CALCIUM 40 MG/1
TABLET, FILM COATED ORAL
Qty: 90 TABLET | Refills: 1 | Status: SHIPPED | OUTPATIENT
Start: 2022-10-17

## 2022-10-17 NOTE — ASSESSMENT & PLAN NOTE
Discussed about low-fat diet continue Lipitor and exercise  Lab Results   Component Value Date    LDLCALC 109 (H) 10/08/2022

## 2022-10-17 NOTE — PROGRESS NOTES
Name: Marcela Saldana      : 1975      MRN: [id number]  Encounter Provider: Johanna Shahid MD  Encounter Date: 10/17/2022   [facility]     1  Screening for colon cancer  -     Cologuard    2  Anxiety  Assessment & Plan:  Anxiety is well control and he is on Wellbutrin, continue same       3  JOSEE (obstructive sleep apnea)  Assessment & Plan:  Using CPAP and sleeps well      4  Mild persistent asthma without complication  Assessment & Plan:  Cont symbicort , he only use 1 puff daily,       5  Mixed hyperlipidemia  Assessment & Plan:  Discussed about low-fat diet continue Lipitor and exercise  Lab Results   Component Value Date    LDLCALC 109 (H) 10/08/2022         Orders:  -     Comprehensive metabolic panel; Future; Expected date: 2023  -     Lipid panel; Future; Expected date: 2023    6  Essential hypertension  Assessment & Plan:  Hypertension is stable, continue low-salt diet, continue same medications  BMI Counseling: Body mass index is 30 13 kg/m²  The BMI is above normal  Nutrition recommendations include decreasing portion sizes, limiting drinks that contain sugar, moderation in carbohydrate intake and reducing intake of cholesterol  Exercise recommendations include exercising 3-5 times per week  Rationale for BMI follow-up plan is due to patient being overweight or obese  Depression Screening and Follow-up Plan: Patient was screened for depression during today's encounter  They screened negative with a PHQ-2 score of 0  His blood sugar is normal and A1c is normal this time   Lab Results   Component Value Date    HGBA1C 5 3 10/08/2022       Subjective     Hypertension  This is a chronic problem  The problem is unchanged  The problem is controlled  Pertinent negatives include no anxiety, blurred vision, chest pain, headaches, neck pain, palpitations or shortness of breath   There are no associated agents to hypertension  Risk factors for coronary artery disease include male gender and dyslipidemia  Past treatments include angiotensin blockers, calcium channel blockers and lifestyle changes  The current treatment provides significant improvement  There are no compliance problems  There is no history of angina, kidney disease or CVA  There is no history of a thyroid problem  Hyperlipidemia  This is a chronic problem  Recent lipid tests were reviewed and are high  He has no history of diabetes or liver disease  Pertinent negatives include no chest pain, focal weakness, leg pain, myalgias or shortness of breath  Current antihyperlipidemic treatment includes exercise and statins  The current treatment provides moderate improvement of lipids  There are no compliance problems  Risk factors for coronary artery disease include dyslipidemia and male sex  Review of Systems   Constitutional: Negative for activity change, appetite change, chills, fatigue, fever and unexpected weight change  HENT: Negative for congestion, ear discharge, ear pain, nosebleeds, postnasal drip, rhinorrhea, sinus pressure, sneezing, sore throat, trouble swallowing and voice change  Eyes: Negative for blurred vision, photophobia, pain, discharge, redness and itching  Respiratory: Negative for cough, chest tightness, shortness of breath and wheezing  Cardiovascular: Negative for chest pain, palpitations and leg swelling  Gastrointestinal: Negative for abdominal pain, constipation, diarrhea, nausea and vomiting  Endocrine: Negative for polyuria  Genitourinary: Negative for dysuria, frequency and urgency  Musculoskeletal: Negative for arthralgias, back pain, myalgias and neck pain  Skin: Negative for color change, pallor and rash  Allergic/Immunologic: Negative for environmental allergies and food allergies  Neurological: Negative for dizziness, focal weakness, weakness, light-headedness and headaches     Hematological: Negative for adenopathy  Does not bruise/bleed easily  Psychiatric/Behavioral: Negative for behavioral problems  The patient is not nervous/anxious          Past Medical History:   Diagnosis Date   • Benign essential hypertension 03/28/2006   • Cellulitis of ankle    • Hyperlipidemia 03/28/2006   • Sting from hornet, wasp, or bee      Past Surgical History:   Procedure Laterality Date   • HERNIA REPAIR       Family History   Problem Relation Age of Onset   • Sleep apnea Father         Adult   • Atrial fibrillation Father    • Stroke Father      Social History     Socioeconomic History   • Marital status: /Civil Union     Spouse name: None   • Number of children: None   • Years of education: None   • Highest education level: None   Occupational History   • None   Tobacco Use   • Smoking status: Former Smoker   • Smokeless tobacco: Never Used   Substance and Sexual Activity   • Alcohol use: Yes     Comment: Rare use   • Drug use: No   • Sexual activity: None   Other Topics Concern   • None   Social History Narrative    Marital history - currently      Social Determinants of Health     Financial Resource Strain: Not on file   Food Insecurity: Not on file   Transportation Needs: Not on file   Physical Activity: Not on file   Stress: Not on file   Social Connections: Not on file   Intimate Partner Violence: Not on file   Housing Stability: Not on file     Current Outpatient Medications on File Prior to Visit   Medication Sig   • albuterol (PROVENTIL HFA,VENTOLIN HFA) 90 mcg/act inhaler inhale 2 puffs by mouth every 6 hours if needed for wheezing   • amLODIPine-valsartan (EXFORGE)  MG per tablet Take 1 tablet by mouth daily   • aspirin 81 MG tablet Take 1 tablet by mouth daily   • atorvastatin (LIPITOR) 40 mg tablet take 1 tablet by mouth once daily   • buPROPion (WELLBUTRIN XL) 300 mg 24 hr tablet take 1 tablet by mouth once daily   • Symbicort 160-4 5 MCG/ACT inhaler inhale 2 puffs by mouth twice a day Rinse mouth after use     Allergies   Allergen Reactions   • Fish Oil - Food Allergy    • Oxycodone-Acetaminophen      Reaction Date: 12Jun2012;      Immunization History   Administered Date(s) Administered   • INFLUENZA 11/14/2015, 01/07/2018, 10/20/2018   • Influenza Injectable, MDCK, Preservative Free, Quadrivalent 11/03/2019   • Influenza Injectable, MDCK, Preservative Free, Quadrivalent, 0 5 mL 10/20/2018   • Influenza Quadrivalent Preservative Free 3 years and older IM 10/22/2016   • Pneumococcal Conjugate 13-Valent 02/02/2016   • Pneumococcal Polysaccharide PPV23 01/23/2020   • TD (adult) Preservative Free 06/20/2018   • Tdap 07/03/2006       Objective     /72   Pulse 80   Resp 16   Ht 5' 11" (1 803 m)   Wt 98 kg (216 lb)   SpO2 96%   BMI 30 13 kg/m²     Physical Exam  Vitals and nursing note reviewed  Constitutional:       Appearance: He is well-developed  HENT:      Head: Normocephalic and atraumatic  Right Ear: Ear canal and external ear normal  There is no impacted cerumen  Left Ear: Ear canal and external ear normal  There is no impacted cerumen  Nose: Nose normal    Eyes:      General: No scleral icterus  Right eye: No discharge  Left eye: No discharge  Extraocular Movements: Extraocular movements intact  Conjunctiva/sclera: Conjunctivae normal       Pupils: Pupils are equal, round, and reactive to light  Comments: Wear lens   Neck:      Thyroid: No thyromegaly  Trachea: No tracheal deviation  Cardiovascular:      Rate and Rhythm: Normal rate and regular rhythm  Heart sounds: Normal heart sounds  No murmur heard  Pulmonary:      Effort: Pulmonary effort is normal  No respiratory distress  Breath sounds: Normal breath sounds  No wheezing or rales  Abdominal:      General: Bowel sounds are normal  There is no distension  Palpations: Abdomen is soft  There is no mass  Tenderness: There is no abdominal tenderness  There is no rebound  Musculoskeletal:         General: Normal range of motion  Cervical back: Normal range of motion and neck supple  Right lower leg: No edema  Left lower leg: No edema  Lymphadenopathy:      Cervical: No cervical adenopathy  Skin:     General: Skin is warm  Coloration: Skin is not pale  Findings: No erythema or rash  Neurological:      Mental Status: He is alert and oriented to person, place, and time  Cranial Nerves: No cranial nerve deficit  Deep Tendon Reflexes: Reflexes are normal and symmetric  Psychiatric:         Behavior: Behavior normal          Thought Content:  Thought content normal          Judgment: Judgment normal        Kenneth Wong MD

## 2022-12-16 PROBLEM — Z12.11 SCREENING FOR COLON CANCER: Status: RESOLVED | Noted: 2020-07-28 | Resolved: 2022-12-16

## 2022-12-20 DIAGNOSIS — J45.20 MILD INTERMITTENT ASTHMA WITHOUT COMPLICATION: ICD-10-CM

## 2022-12-21 RX ORDER — BUDESONIDE AND FORMOTEROL FUMARATE DIHYDRATE 160; 4.5 UG/1; UG/1
AEROSOL RESPIRATORY (INHALATION)
Qty: 10.2 G | Refills: 1 | Status: SHIPPED | OUTPATIENT
Start: 2022-12-21

## 2023-02-26 DIAGNOSIS — I10 ESSENTIAL HYPERTENSION: ICD-10-CM

## 2023-02-27 RX ORDER — AMLODIPINE AND VALSARTAN 10; 160 MG/1; MG/1
TABLET ORAL
Qty: 90 TABLET | Refills: 0 | Status: SHIPPED | OUTPATIENT
Start: 2023-02-27

## 2023-04-23 LAB
ALBUMIN SERPL-MCNC: 4.5 G/DL (ref 3.6–5.1)
ALBUMIN/GLOB SERPL: 1.9 (CALC) (ref 1–2.5)
ALP SERPL-CCNC: 47 U/L (ref 36–130)
ALT SERPL-CCNC: 19 U/L (ref 9–46)
AST SERPL-CCNC: 15 U/L (ref 10–40)
BILIRUB SERPL-MCNC: 0.4 MG/DL (ref 0.2–1.2)
BUN SERPL-MCNC: 19 MG/DL (ref 7–25)
BUN/CREAT SERPL: ABNORMAL (CALC) (ref 6–22)
CALCIUM SERPL-MCNC: 9.4 MG/DL (ref 8.6–10.3)
CHLORIDE SERPL-SCNC: 105 MMOL/L (ref 98–110)
CHOLEST SERPL-MCNC: 168 MG/DL
CHOLEST/HDLC SERPL: 4.8 (CALC)
CO2 SERPL-SCNC: 29 MMOL/L (ref 20–32)
CREAT SERPL-MCNC: 1.14 MG/DL (ref 0.6–1.29)
GFR/BSA.PRED SERPLBLD CYS-BASED-ARV: 79 ML/MIN/1.73M2
GLOBULIN SER CALC-MCNC: 2.4 G/DL (CALC) (ref 1.9–3.7)
GLUCOSE SERPL-MCNC: 106 MG/DL (ref 65–99)
HDLC SERPL-MCNC: 35 MG/DL
LDLC SERPL CALC-MCNC: 110 MG/DL (CALC)
NONHDLC SERPL-MCNC: 133 MG/DL (CALC)
POTASSIUM SERPL-SCNC: 4.4 MMOL/L (ref 3.5–5.3)
PROT SERPL-MCNC: 6.9 G/DL (ref 6.1–8.1)
SODIUM SERPL-SCNC: 139 MMOL/L (ref 135–146)
TRIGL SERPL-MCNC: 123 MG/DL

## 2023-04-24 ENCOUNTER — OFFICE VISIT (OUTPATIENT)
Dept: FAMILY MEDICINE CLINIC | Facility: CLINIC | Age: 48
End: 2023-04-24

## 2023-04-24 VITALS
WEIGHT: 211 LBS | OXYGEN SATURATION: 97 % | SYSTOLIC BLOOD PRESSURE: 134 MMHG | HEIGHT: 71 IN | HEART RATE: 88 BPM | RESPIRATION RATE: 16 BRPM | DIASTOLIC BLOOD PRESSURE: 70 MMHG | BODY MASS INDEX: 29.54 KG/M2

## 2023-04-24 DIAGNOSIS — J06.9 VIRAL UPPER RESPIRATORY TRACT INFECTION: ICD-10-CM

## 2023-04-24 DIAGNOSIS — I10 ESSENTIAL HYPERTENSION: ICD-10-CM

## 2023-04-24 DIAGNOSIS — R73.01 IMPAIRED FASTING GLUCOSE: ICD-10-CM

## 2023-04-24 DIAGNOSIS — F41.9 ANXIETY: ICD-10-CM

## 2023-04-24 DIAGNOSIS — G47.33 OSA (OBSTRUCTIVE SLEEP APNEA): Primary | ICD-10-CM

## 2023-04-24 DIAGNOSIS — E78.2 MIXED HYPERLIPIDEMIA: ICD-10-CM

## 2023-04-24 DIAGNOSIS — J45.30 MILD PERSISTENT ASTHMA WITHOUT COMPLICATION: ICD-10-CM

## 2023-04-24 NOTE — PROGRESS NOTES
Name: Bakari Farr      : 1975      MRN: [id number]  Encounter Provider: Pastora Romeo MD  Encounter Date: 2023   Encounter department: [facility]     1  JOSEE (obstructive sleep apnea)    2  Impaired fasting glucose  Assessment & Plan:  His fasting blood sugar fluctuates,, discussed with him to continue low-fat diet and exercise and low carb diet    Orders:  -     Comprehensive metabolic panel; Future; Expected date: 10/24/2023  -     Hemoglobin A1C; Future; Expected date: 10/24/2023    3  Essential hypertension  Assessment & Plan:  Hypertension is stable, continue low-salt diet, continue same medications  Orders:  -     CBC and differential; Future; Expected date: 10/24/2023    4  Mixed hyperlipidemia  Assessment & Plan:  Lipids have been stable, continue statin and low-fat diet    Orders:  -     Comprehensive metabolic panel; Future; Expected date: 10/24/2023  -     Lipid panel; Future; Expected date: 10/24/2023    5  Anxiety  Assessment & Plan:  Stable on Wellbutrin, he will continue same      6  Viral upper respiratory tract infection  Assessment & Plan:  Symptomatic care, no need for any antibiotic      7  Mild persistent asthma without complication  Assessment & Plan:  His lungs are clear, he will continue Symbicort             Subjective     He is here for follow-up, he had his labs done, he has sleep apnea, hypertension, hyperlipidemia, he is active no new complaints except he has some URI symptoms and this is almost 10-day, he had slight nasal discharge some scratchy throat but getting better, no fever or chills,no GI symptoms    Review of Systems   Constitutional: Negative for activity change, appetite change, chills, fatigue, fever and unexpected weight change  HENT: Positive for congestion   Negative for ear discharge, ear pain, nosebleeds, postnasal drip, rhinorrhea, sinus pressure, sneezing, sore throat, trouble swallowing and voice change  Eyes: Negative for photophobia, pain, discharge, redness and itching  Respiratory: Negative for cough, chest tightness, shortness of breath and wheezing  Cardiovascular: Negative for chest pain, palpitations and leg swelling  Gastrointestinal: Negative for abdominal pain, constipation, diarrhea, nausea and vomiting  Endocrine: Negative for polyuria  Genitourinary: Negative for dysuria, frequency and urgency  Musculoskeletal: Negative for arthralgias, back pain, myalgias and neck pain  Skin: Negative for color change, pallor and rash  Allergic/Immunologic: Negative for environmental allergies and food allergies  Neurological: Negative for dizziness, weakness, light-headedness and headaches  Hematological: Negative for adenopathy  Does not bruise/bleed easily  Psychiatric/Behavioral: Negative for behavioral problems  The patient is not nervous/anxious          Past Medical History:   Diagnosis Date   • Benign essential hypertension 03/28/2006   • Cellulitis of ankle    • Hyperlipidemia 03/28/2006   • Sting from hornet, wasp, or bee      Past Surgical History:   Procedure Laterality Date   • HERNIA REPAIR       Family History   Problem Relation Age of Onset   • Sleep apnea Father         Adult   • Atrial fibrillation Father    • Stroke Father      Social History     Socioeconomic History   • Marital status: /Civil Union     Spouse name: None   • Number of children: None   • Years of education: None   • Highest education level: None   Occupational History   • None   Tobacco Use   • Smoking status: Former   • Smokeless tobacco: Never   Substance and Sexual Activity   • Alcohol use: Yes     Comment: Rare use   • Drug use: No   • Sexual activity: None   Other Topics Concern   • None   Social History Narrative    Marital history - currently      Social Determinants of Health     Financial Resource Strain: Not on file   Food Insecurity: Not on file   Transportation Needs: Not on "file   Physical Activity: Not on file   Stress: Not on file   Social Connections: Not on file   Intimate Partner Violence: Not on file   Housing Stability: Not on file     Current Outpatient Medications on File Prior to Visit   Medication Sig   • albuterol (PROVENTIL HFA,VENTOLIN HFA) 90 mcg/act inhaler inhale 2 puffs by mouth every 6 hours if needed for wheezing   • amLODIPine-valsartan (EXFORGE)  MG per tablet take 1 tablet by mouth once daily   • aspirin 81 MG tablet Take 1 tablet by mouth daily   • atorvastatin (LIPITOR) 40 mg tablet take 1 tablet by mouth once daily   • buPROPion (WELLBUTRIN XL) 300 mg 24 hr tablet take 1 tablet by mouth once daily   • Symbicort 160-4 5 MCG/ACT inhaler inhale 2 puffs by mouth INTO THE LUNGS twice a day Rinse mouth after use     Allergies   Allergen Reactions   • Fish Oil - Food Allergy    • Oxycodone-Acetaminophen      Reaction Date: 12Jun2012;      Immunization History   Administered Date(s) Administered   • INFLUENZA 11/14/2015, 01/07/2018, 10/20/2018   • Influenza Injectable, MDCK, Preservative Free, Quadrivalent 11/03/2019   • Influenza Injectable, MDCK, Preservative Free, Quadrivalent, 0 5 mL 10/20/2018   • Influenza Quadrivalent Preservative Free 3 years and older IM 10/22/2016   • Pneumococcal Conjugate 13-Valent 02/02/2016   • Pneumococcal Polysaccharide PPV23 01/23/2020   • TD (adult) Preservative Free 06/20/2018   • Tdap 07/03/2006       Objective     /70   Pulse 88   Resp 16   Ht 5' 11\" (1 803 m)   Wt 95 7 kg (211 lb)   SpO2 97%   BMI 29 43 kg/m²     Physical Exam  Vitals and nursing note reviewed  Constitutional:       Appearance: Normal appearance  He is well-developed  He is not ill-appearing  HENT:      Head: Normocephalic and atraumatic  Right Ear: External ear normal       Left Ear: External ear normal       Mouth/Throat:      Pharynx: Posterior oropharyngeal erythema present  No oropharyngeal exudate     Eyes:      General: No scleral " icterus  Extraocular Movements: Extraocular movements intact  Conjunctiva/sclera: Conjunctivae normal    Neck:      Thyroid: No thyromegaly  Cardiovascular:      Rate and Rhythm: Normal rate and regular rhythm  Heart sounds: Normal heart sounds  No murmur heard  Pulmonary:      Effort: Pulmonary effort is normal       Breath sounds: Normal breath sounds  No wheezing or rales  Abdominal:      General: Abdomen is flat  There is no distension  Palpations: There is no mass  Musculoskeletal:         General: Normal range of motion  Cervical back: Normal range of motion and neck supple  Right lower leg: No edema  Left lower leg: No edema  Lymphadenopathy:      Cervical: No cervical adenopathy  Skin:     Coloration: Skin is not jaundiced  Findings: No erythema or rash  Neurological:      General: No focal deficit present  Mental Status: He is alert     Psychiatric:         Mood and Affect: Mood normal        Alda Michelle MD

## 2023-04-24 NOTE — ASSESSMENT & PLAN NOTE
His fasting blood sugar fluctuates,, discussed with him to continue low-fat diet and exercise and low carb diet

## 2023-05-15 DIAGNOSIS — F41.9 ANXIETY: ICD-10-CM

## 2023-05-16 DIAGNOSIS — F41.9 ANXIETY: ICD-10-CM

## 2023-05-16 RX ORDER — BUPROPION HYDROCHLORIDE 300 MG/1
300 TABLET ORAL DAILY
Qty: 90 TABLET | Refills: 1 | Status: SHIPPED | OUTPATIENT
Start: 2023-05-16

## 2023-05-16 RX ORDER — BUPROPION HYDROCHLORIDE 300 MG/1
TABLET ORAL
Qty: 30 TABLET | Refills: 0 | Status: SHIPPED | OUTPATIENT
Start: 2023-05-16 | End: 2023-05-16 | Stop reason: SDUPTHER

## 2023-05-22 ENCOUNTER — TELEPHONE (OUTPATIENT)
Dept: FAMILY MEDICINE CLINIC | Facility: CLINIC | Age: 48
End: 2023-05-22

## 2023-05-22 ENCOUNTER — APPOINTMENT (EMERGENCY)
Dept: CT IMAGING | Facility: HOSPITAL | Age: 48
End: 2023-05-22

## 2023-05-22 ENCOUNTER — HOSPITAL ENCOUNTER (EMERGENCY)
Facility: HOSPITAL | Age: 48
Discharge: HOME | End: 2023-05-22
Attending: EMERGENCY MEDICINE

## 2023-05-22 VITALS
SYSTOLIC BLOOD PRESSURE: 128 MMHG | OXYGEN SATURATION: 97 % | DIASTOLIC BLOOD PRESSURE: 79 MMHG | HEART RATE: 76 BPM | RESPIRATION RATE: 18 BRPM | TEMPERATURE: 98.7 F | WEIGHT: 208.78 LBS | BODY MASS INDEX: 29.12 KG/M2

## 2023-05-22 DIAGNOSIS — R10.9 FLANK PAIN: Primary | ICD-10-CM

## 2023-05-22 LAB
ALBUMIN SERPL BCP-MCNC: 4.5 G/DL (ref 3.5–5)
ALP SERPL-CCNC: 46 U/L (ref 34–104)
ALT SERPL W P-5'-P-CCNC: 18 U/L (ref 7–52)
ANION GAP SERPL CALCULATED.3IONS-SCNC: 8 MMOL/L (ref 4–13)
AST SERPL W P-5'-P-CCNC: 12 U/L (ref 13–39)
BASOPHILS # BLD AUTO: 0.02 THOUSANDS/ÂΜL (ref 0–0.1)
BASOPHILS NFR BLD AUTO: 0 % (ref 0–1)
BILIRUB SERPL-MCNC: 0.48 MG/DL (ref 0.2–1)
BILIRUB UR QL STRIP: NEGATIVE
BUN SERPL-MCNC: 13 MG/DL (ref 5–25)
CALCIUM SERPL-MCNC: 9.3 MG/DL (ref 8.4–10.2)
CHLORIDE SERPL-SCNC: 106 MMOL/L (ref 96–108)
CLARITY UR: CLEAR
CO2 SERPL-SCNC: 26 MMOL/L (ref 21–32)
COLOR UR: COLORLESS
CREAT SERPL-MCNC: 0.89 MG/DL (ref 0.6–1.3)
EOSINOPHIL # BLD AUTO: 0.14 THOUSAND/ÂΜL (ref 0–0.61)
EOSINOPHIL NFR BLD AUTO: 2 % (ref 0–6)
ERYTHROCYTE [DISTWIDTH] IN BLOOD BY AUTOMATED COUNT: 12.8 % (ref 11.6–15.1)
GFR SERPL CREATININE-BSD FRML MDRD: 101 ML/MIN/1.73SQ M
GLUCOSE SERPL-MCNC: 90 MG/DL (ref 65–140)
GLUCOSE UR STRIP-MCNC: NEGATIVE MG/DL
HCT VFR BLD AUTO: 43.3 % (ref 36.5–49.3)
HGB BLD-MCNC: 15 G/DL (ref 12–17)
HGB UR QL STRIP.AUTO: NEGATIVE
IMM GRANULOCYTES # BLD AUTO: 0.04 THOUSAND/UL (ref 0–0.2)
IMM GRANULOCYTES NFR BLD AUTO: 1 % (ref 0–2)
KETONES UR STRIP-MCNC: NEGATIVE MG/DL
LEUKOCYTE ESTERASE UR QL STRIP: NEGATIVE
LYMPHOCYTES # BLD AUTO: 2.03 THOUSANDS/ÂΜL (ref 0.6–4.47)
LYMPHOCYTES NFR BLD AUTO: 25 % (ref 14–44)
MCH RBC QN AUTO: 33.2 PG (ref 26.8–34.3)
MCHC RBC AUTO-ENTMCNC: 34.6 G/DL (ref 31.4–37.4)
MCV RBC AUTO: 96 FL (ref 82–98)
MONOCYTES # BLD AUTO: 0.61 THOUSAND/ÂΜL (ref 0.17–1.22)
MONOCYTES NFR BLD AUTO: 8 % (ref 4–12)
NEUTROPHILS # BLD AUTO: 5.19 THOUSANDS/ÂΜL (ref 1.85–7.62)
NEUTS SEG NFR BLD AUTO: 64 % (ref 43–75)
NITRITE UR QL STRIP: NEGATIVE
NRBC BLD AUTO-RTO: 0 /100 WBCS
PH UR STRIP.AUTO: 6 [PH]
PLATELET # BLD AUTO: 207 THOUSANDS/UL (ref 149–390)
PMV BLD AUTO: 10.4 FL (ref 8.9–12.7)
POTASSIUM SERPL-SCNC: 3.5 MMOL/L (ref 3.5–5.3)
PROT SERPL-MCNC: 7.3 G/DL (ref 6.4–8.4)
PROT UR STRIP-MCNC: NEGATIVE MG/DL
RBC # BLD AUTO: 4.52 MILLION/UL (ref 3.88–5.62)
SODIUM SERPL-SCNC: 140 MMOL/L (ref 135–147)
SP GR UR STRIP.AUTO: 1 (ref 1–1.03)
UROBILINOGEN UR STRIP-ACNC: <2 MG/DL
WBC # BLD AUTO: 8.03 THOUSAND/UL (ref 4.31–10.16)

## 2023-05-22 RX ORDER — METHOCARBAMOL 500 MG/1
500 TABLET, FILM COATED ORAL 2 TIMES DAILY
Qty: 20 TABLET | Refills: 0 | Status: SHIPPED | OUTPATIENT
Start: 2023-05-22

## 2023-05-22 RX ADMIN — IOHEXOL 100 ML: 350 INJECTION, SOLUTION INTRAVENOUS at 18:08

## 2023-05-22 NOTE — TELEPHONE ENCOUNTER
Pt called and states he has been having abdominal pain for 1 week  Went to urgent care and they told him no uti just to take stool softeners  Pt said it gets bad that he can not sleep sometimes     Instructed pt to go to ER

## 2023-05-22 NOTE — ED PROVIDER NOTES
History  Chief Complaint   Patient presents with   • Back Pain     X2 weeks with back pain/rt sided flank pain  Pt states pain radiates to the groin area  [facility] for [facility]TI, no findings  Denies urinary sx  Denies fevers/rashes  Last BM this morning, pt states hes been taking laxatives to help  70-year-old male with history of hyperlipidemia presents today with approximately 2 weeks of right-sided flank pain  He states it radiates into his groin and has testicular pressure  He describes the pain as constant but waxing and waning in severity  He states it goes from a 2 out of 10 to a 5-6 out of 10  He cannot pinpoint any exacerbating factors  Pain is partially relieved with over-the-counter ibuprofen and aspirin  He was seen in urgent care on 5/11 and had a UA which was negative for hematuria, and a KUB which patient states they told him he was constipated  Patient has been trying laxative and stool softeners since  He has had irregular bowel movements and complains of minor constipation  He denies any other symptoms at this time including fever/chills, midline back pain, saddle anesthesia, hematochezia, hematuria, tachycardia, chest pain/shortness of breath, nausea/vomiting  Prior to Admission Medications   Prescriptions Last Dose Informant Patient Reported? Taking?    Symbicort 160-4 5 MCG/ACT inhaler   No Yes   Sig: inhale 2 puffs by mouth INTO THE LUNGS twice a day Rinse mouth after use   albuterol (PROVENTIL HFA,VENTOLIN HFA) 90 mcg/act inhaler   No Yes   Sig: inhale 2 puffs by mouth every 6 hours if needed for wheezing   amLODIPine-valsartan (EXFORGE)  MG per tablet   No Yes   Sig: take 1 tablet by mouth once daily   aspirin 81 MG tablet   Yes Yes   Sig: Take 1 tablet by mouth daily   atorvastatin (LIPITOR) 40 mg tablet   No Yes   Sig: take 1 tablet by mouth once daily   buPROPion (WELLBUTRIN XL) 300 mg 24 hr tablet   No Yes   Sig: Take 1 tablet (300 mg total) by mouth daily      Facility-Administered Medications: None       Past Medical History:   Diagnosis Date   • Benign essential hypertension 03/28/2006   • Cellulitis of ankle    • Hyperlipidemia 03/28/2006   • Sting from hornet, wasp, or bee        Past Surgical History:   Procedure Laterality Date   • HERNIA REPAIR         Family History   Problem Relation Age of Onset   • Sleep apnea Father         Adult   • Atrial fibrillation Father    • Stroke Father      I have reviewed and agree with the history as documented  E-Cigarette/Vaping     E-Cigarette/Vaping Substances     Social History     Tobacco Use   • Smoking status: Former   • Smokeless tobacco: Never   Substance Use Topics   • Alcohol use: Yes     Comment: Rare use   • Drug use: No        Review of Systems   Constitutional: Negative for chills and fever  HENT: Negative for hearing loss and rhinorrhea  Eyes: Negative for visual disturbance  Respiratory: Negative for cough and shortness of breath  Cardiovascular: Negative for chest pain  Gastrointestinal: Positive for abdominal pain (RLQ, radiating from flank) and constipation  Negative for diarrhea, nausea and vomiting  Genitourinary: Positive for flank pain and testicular pain (Radiating from flank)  Negative for dysuria and hematuria  Musculoskeletal: Negative for back pain  Skin: Negative for color change and rash  Neurological: Negative for weakness and numbness  Psychiatric/Behavioral: Negative for agitation  All other systems reviewed and are negative        Physical Exam  ED Triage Vitals [05/22/23 1619]   Temperature Pulse Respirations Blood Pressure SpO2   98 7 °F (37 1 °C) 97 18 141/95 97 %      Temp Source Heart Rate Source Patient Position - Orthostatic VS BP Location FiO2 (%)   Oral Monitor Sitting Right arm --      Pain Score       5             Orthostatic Vital Signs  Vitals:    05/22/23 1619 05/22/23 1700 05/22/23 1800 05/22/23 1959   BP: 141/95 137/79 130/82 128/79   Pulse: 97 83 79 76   Patient Position - Orthostatic VS: Sitting   Lying       Physical Exam  Vitals and nursing note reviewed  Constitutional:       General: He is not in acute distress  Appearance: He is not ill-appearing  HENT:      Head: Normocephalic and atraumatic  Right Ear: External ear normal       Left Ear: External ear normal       Nose: Nose normal  No congestion or rhinorrhea  Mouth/Throat:      Mouth: Mucous membranes are moist       Pharynx: Oropharynx is clear  Eyes:      General: No scleral icterus  Extraocular Movements: Extraocular movements intact  Cardiovascular:      Rate and Rhythm: Normal rate and regular rhythm  Pulses: Normal pulses  Heart sounds: Normal heart sounds  Pulmonary:      Effort: Pulmonary effort is normal       Breath sounds: Normal breath sounds  Abdominal:      Palpations: Abdomen is soft  Tenderness: There is no abdominal tenderness  There is right CVA tenderness  There is no left CVA tenderness  Musculoskeletal:         General: Normal range of motion  Cervical back: Normal range of motion  Skin:     General: Skin is warm and dry  Neurological:      General: No focal deficit present  Mental Status: He is alert and oriented to person, place, and time     Psychiatric:         Mood and Affect: Mood normal          Behavior: Behavior normal          ED Medications  Medications   iohexol (OMNIPAQUE) 350 MG/ML injection (SINGLE-DOSE) 100 mL (100 mL Intravenous Given 5/22/23 1808)       Diagnostic Studies  Results Reviewed     Procedure Component Value Units Date/Time    UA w Reflex to Microscopic w Reflex to Culture [[id number]] Collected: 05/22/23 1652    Lab Status: Final result Specimen: Urine, Clean Catch Updated: 05/22/23 1840     Color, UA Colorless     Clarity, UA Clear     Specific Orient, UA 1 005     pH, UA 6 0     Leukocytes, UA Negative     Nitrite, UA Negative     Protein, UA Negative mg/dl Glucose, UA Negative mg/dl      Ketones, UA Negative mg/dl      Urobilinogen, UA <2 0 mg/dl      Bilirubin, UA Negative     Occult Blood, UA Negative    Comprehensive metabolic panel [[id number]]  (Abnormal) Collected: 05/22/23 1651    Lab Status: Final result Specimen: Blood from Arm, Left Updated: 05/22/23 1755     Sodium 140 mmol/L      Potassium 3 5 mmol/L      Chloride 106 mmol/L      CO2 26 mmol/L      ANION GAP 8 mmol/L      BUN 13 mg/dL      Creatinine 0 89 mg/dL      Glucose 90 mg/dL      Calcium 9 3 mg/dL      AST 12 U/L      ALT 18 U/L      Alkaline Phosphatase 46 U/L      Total Protein 7 3 g/dL      Albumin 4 5 g/dL      Total Bilirubin 0 48 mg/dL      eGFR 101 ml/min/1 73sq m     Narrative:      National Kidney Disease Foundation guidelines for Chronic Kidney Disease (CKD):   •  Stage 1 with normal or high GFR (GFR > 90 mL/min/1 73 square meters)  •  Stage 2 Mild CKD (GFR = 60-89 mL/min/1 73 square meters)  •  Stage 3A Moderate CKD (GFR = 45-59 mL/min/1 73 square meters)  •  Stage 3B Moderate CKD (GFR = 30-44 mL/min/1 73 square meters)  •  Stage 4 Severe CKD (GFR = 15-29 mL/min/1 73 square meters)  •  Stage 5 End Stage CKD (GFR <15 mL/min/1 73 square meters)  Note: GFR calculation is accurate only with a steady state creatinine    CBC and differential [[id number]] Collected: 05/22/23 1651    Lab Status: Final result Specimen: Blood from Arm, Left Updated: 05/22/23 1740     WBC 8 03 Thousand/uL      RBC 4 52 Million/uL      Hemoglobin 15 0 g/dL      Hematocrit 43 3 %      MCV 96 fL      MCH 33 2 pg      MCHC 34 6 g/dL      RDW 12 8 %      MPV 10 4 fL      Platelets 169 Thousands/uL      nRBC 0 /100 WBCs      Neutrophils Relative 64 %      Immat GRANS % 1 %      Lymphocytes Relative 25 %      Monocytes Relative 8 %      Eosinophils Relative 2 %      Basophils Relative 0 %      Neutrophils Absolute 5 19 Thousands/µL      Immature Grans Absolute 0 04 Thousand/uL      Lymphocytes Absolute 2 03 Thousands/µL Monocytes Absolute 0 61 Thousand/µL      Eosinophils Absolute 0 14 Thousand/µL      Basophils Absolute 0 02 Thousands/µL                  CT abdomen pelvis with contrast   Final Result by Jacque Castellanos MD (05/22 1953)      No acute inflammatory process identified in the abdomen or pelvis  Workstation performed: [id number]               Procedures  Procedures      ED Course  ED Course as of 05/22/23 2145   [facility] May 22, 2023   1804 Comprehensive metabolic panel(!)  wnl   9522 CBC and differential  wnl                                       Medical Decision Making  Phillip Newsome presented due to 2 weeks of right-sided flank pain that radiates into the groin  He states approximate 2 weeks ago he was moving hay samina, and believes that may have been when it started  Upon arrival to the ED patient was only in minor pain and did not require any pain medication for symptoms  UA was clear with no signs of infection or hematuria  CBC CMP were within normal limits and reassuring with no signs of endorgan damage  CT abdomen pelvis showed no signs of infection, nephrolithiasis, or any other acute abnormalities  Due to this it was discussed with the patient is likely musculoskeletal pain and he was instructed to rest and protect the area is much as possible  Use ice and Tylenol and Motrin for pain and symptoms  Patient understood and agreed this plan  He was instructed to follow-up with his PCP for further management and treatment if needed  Patient discharged in stable condition  Strict return precautions given if symptoms are worsening or not resolving  Amount and/or Complexity of Data Reviewed  Labs: ordered  Decision-making details documented in ED Course  Radiology: ordered  Risk  Prescription drug management              Disposition  Final diagnoses:   Flank pain     Time reflects when diagnosis was documented in both MDM as applicable and the Disposition within this note     Time User Action Codes Description Comment    5/22/2023  8:04 PM Antony Birmingham Add [R10 9] Flank pain       ED Disposition     ED Disposition   Discharge    Condition   Stable    Date/Time   Mon May 22, 2023  8:04 PM    Comment   [facility] discharge to home/self care  Follow-up Information     Follow up With Specialties Details Why Contact Info Additional Information    Deanna Levy MD Family Medicine Call in 3 days As needed, For ED follow-up [address]  [facility] [facility] [address] [facility] [phone]       [address] Emergency Department Emergency Medicine Go to  If symptoms worsen or do not resolve [address] [facility] [id number] [place] [address] Emergency Department, [address], [place], [place], [address]          Discharge Medication List as of 5/22/2023  8:07 PM      START taking these medications    Details   methocarbamol (ROBAXIN) 500 mg tablet Take 1 tablet (500 mg total) by mouth 2 (two) times a day, Starting Mon 5/22/2023, Normal         CONTINUE these medications which have NOT CHANGED    Details   albuterol (PROVENTIL HFA,VENTOLIN HFA) 90 mcg/act inhaler inhale 2 puffs by mouth every 6 hours if needed for wheezing, Normal      amLODIPine-valsartan (EXFORGE)  MG per tablet take 1 tablet by mouth once daily, Normal      aspirin 81 MG tablet Take 1 tablet by mouth daily, Starting Tue 8/2/2016, Historical Med      atorvastatin (LIPITOR) 40 mg tablet take 1 tablet by mouth once daily, Normal      buPROPion (WELLBUTRIN XL) 300 mg 24 hr tablet Take 1 tablet (300 mg total) by mouth daily, Starting Tue 5/16/2023, Normal      Symbicort 160-4 5 MCG/ACT inhaler inhale 2 puffs by mouth INTO THE LUNGS twice a day Rinse mouth after use, Normal           No discharge procedures on file  PDMP Review     None           ED Provider  Attending physically available and evaluated Mihir Martinez   I managed the patient along with the ED Attending      Electronically Signed by         Denisa Cortez,   05/22/23 5316

## 2023-05-27 NOTE — ED ATTENDING ATTESTATION
5/22/2023  Autumn Sagastume DO, saw and evaluated the patient  I have discussed the patient with the resident/non-physician practitioner and agree with the resident's/non-physician practitioner's findings, Plan of Care, and MDM as documented in the resident's/non-physician practitioner's note, except where noted  All available labs and Radiology studies were reviewed  I was present for key portions of any procedure(s) performed by the resident/non-physician practitioner and I was immediately available to provide assistance  At this point I agree with the current assessment done in the Emergency Department    I have conducted an independent evaluation of this patient a history and physical is as follows:    ED Course         Critical Care Time  Procedures

## 2023-05-28 DIAGNOSIS — I10 ESSENTIAL HYPERTENSION: ICD-10-CM

## 2023-05-31 RX ORDER — AMLODIPINE AND VALSARTAN 10; 160 MG/1; MG/1
TABLET ORAL
Qty: 90 TABLET | Refills: 0 | Status: SHIPPED | OUTPATIENT
Start: 2023-05-31

## 2023-06-23 PROBLEM — J06.9 VIRAL UPPER RESPIRATORY TRACT INFECTION: Status: RESOLVED | Noted: 2023-04-24 | Resolved: 2023-06-23

## 2023-08-16 ENCOUNTER — OFFICE VISIT (OUTPATIENT)
Dept: SLEEP CENTER | Facility: CLINIC | Age: 48
End: 2023-08-16
Payer: COMMERCIAL

## 2023-08-16 VITALS
DIASTOLIC BLOOD PRESSURE: 72 MMHG | BODY MASS INDEX: 29.12 KG/M2 | SYSTOLIC BLOOD PRESSURE: 128 MMHG | HEIGHT: 71 IN | WEIGHT: 208 LBS

## 2023-08-16 DIAGNOSIS — F41.9 ANXIETY: ICD-10-CM

## 2023-08-16 DIAGNOSIS — I10 ESSENTIAL HYPERTENSION: ICD-10-CM

## 2023-08-16 DIAGNOSIS — E66.3 OVERWEIGHT (BMI 25.0-29.9): ICD-10-CM

## 2023-08-16 DIAGNOSIS — J45.20 MILD INTERMITTENT ASTHMA WITHOUT COMPLICATION: ICD-10-CM

## 2023-08-16 DIAGNOSIS — G47.33 OSA (OBSTRUCTIVE SLEEP APNEA): Primary | ICD-10-CM

## 2023-08-16 PROCEDURE — 99214 OFFICE O/P EST MOD 30 MIN: CPT | Performed by: INTERNAL MEDICINE

## 2023-08-16 NOTE — PATIENT INSTRUCTIONS

## 2023-08-16 NOTE — PROGRESS NOTES
Follow-Up Note - [address] [facility] [place], [place] Sean  49 y.o. male  :1975  RAHMAN:[id number]  DOS:2023    CC: I saw this patient for follow-up in clinic today for Sleep disordered breathing, Coexisting Sleep and Medical Problems. Interval changes: Patient received ot a  [employer] Version 2 machine from [place] over a year ago. Results of prior studies in 2018: A diagnostic study demonstrated an AHI of 35 / hour, higher while supine with minimum oxygen saturation of 83% and he spent 26% of total sleep time with saturations less than 90%. There were also frequent spontaneous arousals: Index of 75 / hour. During the subsequent therapeutic study, sleep disordered breathing was sufficiently remediated with PAP at 16 cm H2O. He was observed during stage REM but not while supine. PFSH, Problem List, Medications & Allergies were reviewed in EMR. He  has a past medical history of Benign essential hypertension (2006), Cellulitis of ankle, Hyperlipidemia (2006), and Sting from hornet, wasp, or bee. He has a current medication list which includes the following prescription(s): albuterol, amlodipine-valsartan, aspirin, atorvastatin, bupropion, methocarbamol, and symbicort. PHYSIOLOGICAL DATA REVIEW : Using PAP > 4 hours/night 100%. Estimated RL 1/hour with pressure of 14cm Lizzette@[employer] percentile; patient has not been using non FDA approved devices to sanitize the machine. INTERPRETATION: Compliance is excellent; Pressure setting is:optimal; ;   SUBJECTIVE: With respect to use of PAP, Angela Osorio  is experiencing some adverse effects:mask causes redness / facial marks . He no longer has aerophagia. He derives benefit. Is satisfied with sleep and daytime function. Sleep Routine: Angela Osorio reports getting 7-8 hrs sleep; he has no difficulty initiating or maintaining sleep . He arises spontaneously and feels refreshed. Angela Osorio denies excessive daytime sleepiness,.  He rated [himself] at Total score: 1 /24 on the [place] Sleepiness Scale. Other issues: None reported. Habits:[ reports that he has quit smoking. He has never used smokeless tobacco.], [ reports current alcohol use.], [ reports no history of drug use.], Caffeine use:limited; Exercise routine: regular. ROS: Significant for few pounds weight reduction. Allergies and asthma control. Mood is stable on current medication. Carlos Manuel Gladys EXAM: /72   Ht 5' 11" (1.803 m)   Wt 94.3 kg (208 lb)   BMI 29.01 kg/m²     Wt Readings from Last 3 Encounters:   08/16/23 94.3 kg (208 lb)   05/22/23 94.7 kg (208 lb 12.4 oz)   04/24/23 95.7 kg (211 lb)      Patient is well groomed; well appearing. CNS: Alert, orientated, speech clear & coherent  Psych: cooperative and in no distress. Mental state: Appears normal.  H&N: EOMI; NC/AT: No facial pressure marks, no rashes. Skin/Extrem: col & hydration normal; no edema  Resp: Respiratory effort is normal  Physical findings otherwise essentially unchanged from previous. IMPRESSION: Problem List Items & Comorbidities Addressed this Visit    1. JOSEE (obstructive sleep apnea)        2. Mild intermittent asthma without complication        3. Anxiety        4. Essential hypertension            PLAN:  1. I reviewed results of prior studies and physiologic data with the patient. 2. I discussed treatment options with risks and benefits. 3. Treatment with  PAP is medically necessary and Liddie Breeding is agreable to continue use. 4. Care of equipment, methods to improve comfort using PAP and importance of compliance with therapy were discussed. 5. Pressure setting: continue 13-17 cmH2O.    6. Rx provided to replace supplies and Care coordinated with DME provider. 7. Discussed strategies for weight reduction. 8. Follow-up is advised in 1 year or sooner if needed to monitor progress, compliance and to adjust therapy.      Thank you for allowing me to participate in the care of this patient. Sincerely,     Authenticated electronically on 88/55/66   Board Certified Specialist     Portions of the record may have been created with voice recognition software. Occasional wrong word or "sound a like" substitutions may have occurred due to the inherent limitations of voice recognition software. There may also be notations and random deletions of words or characters from malfunctioning software. Read the chart carefully and recognize, using context, where substitutions/deletions have occurred. 26.9

## 2023-08-22 DIAGNOSIS — J45.20 MILD INTERMITTENT ASTHMA WITHOUT COMPLICATION: ICD-10-CM

## 2023-08-23 RX ORDER — BUDESONIDE AND FORMOTEROL FUMARATE DIHYDRATE 160; 4.5 UG/1; UG/1
AEROSOL RESPIRATORY (INHALATION)
Qty: 10.2 G | Refills: 1 | Status: SHIPPED | OUTPATIENT
Start: 2023-08-23

## 2023-08-28 DIAGNOSIS — I10 ESSENTIAL HYPERTENSION: ICD-10-CM

## 2023-08-28 RX ORDER — AMLODIPINE AND VALSARTAN 10; 160 MG/1; MG/1
TABLET ORAL
Qty: 90 TABLET | Refills: 0 | Status: SHIPPED | OUTPATIENT
Start: 2023-08-28

## 2023-09-11 LAB — COLOGUARD RESULT REPORTABLE: NEGATIVE

## 2023-09-12 ENCOUNTER — TELEPHONE (OUTPATIENT)
Dept: FAMILY MEDICINE CLINIC | Facility: CLINIC | Age: 48
End: 2023-09-12

## 2023-09-12 NOTE — TELEPHONE ENCOUNTER
----- Message from Stephany Vigil MD sent at 9/12/2023  8:30 AM EDT -----  Normal cologuard result , please inform the patient

## 2023-10-12 ENCOUNTER — OFFICE VISIT (OUTPATIENT)
Dept: FAMILY MEDICINE CLINIC | Facility: CLINIC | Age: 48
End: 2023-10-12
Payer: COMMERCIAL

## 2023-10-12 ENCOUNTER — NURSE TRIAGE (OUTPATIENT)
Age: 48
End: 2023-10-12

## 2023-10-12 VITALS
DIASTOLIC BLOOD PRESSURE: 80 MMHG | OXYGEN SATURATION: 97 % | BODY MASS INDEX: 29.12 KG/M2 | TEMPERATURE: 97.4 F | HEART RATE: 84 BPM | HEIGHT: 71 IN | WEIGHT: 208 LBS | SYSTOLIC BLOOD PRESSURE: 120 MMHG

## 2023-10-12 DIAGNOSIS — G47.33 OSA (OBSTRUCTIVE SLEEP APNEA): ICD-10-CM

## 2023-10-12 DIAGNOSIS — J20.9 ACUTE BRONCHITIS, UNSPECIFIED ORGANISM: Primary | ICD-10-CM

## 2023-10-12 DIAGNOSIS — J45.30 MILD PERSISTENT ASTHMA WITHOUT COMPLICATION: ICD-10-CM

## 2023-10-12 PROCEDURE — 99214 OFFICE O/P EST MOD 30 MIN: CPT | Performed by: FAMILY MEDICINE

## 2023-10-12 RX ORDER — AZITHROMYCIN 250 MG/1
TABLET, FILM COATED ORAL
Qty: 6 TABLET | Refills: 0 | Status: SHIPPED | OUTPATIENT
Start: 2023-10-12 | End: 2023-10-17

## 2023-10-12 RX ORDER — PREDNISONE 20 MG/1
40 TABLET ORAL DAILY
Qty: 10 TABLET | Refills: 0 | Status: SHIPPED | OUTPATIENT
Start: 2023-10-12 | End: 2023-10-17

## 2023-10-12 NOTE — TELEPHONE ENCOUNTER
Reason for Disposition  • Sinus congestion (pressure, fullness) present > 10 days    Answer Assessment - Initial Assessment Questions  ONSET: "When did the sinus pain start?"  (e.g., hours, days)       Over 10 days ago  NASAL CONGESTION: "Is the nose blocked?" If so, ask, "Can you open it or must you breathe through the mouth?"      + nasal congestion   NASAL DISCHARGE: "Do you have discharge from your nose?" If so ask, "What color?"      denies  FEVER: "Do you have a fever?" If so, ask: "What is it, how was it measured, and when did it start?"       denies  OTHER SYMPTOMS: "Do you have any other symptoms?" (e.g., sore throat, cough, earache, difficulty breathing)      Cough with phlegm, chest congestion    Protocols used: Sinus Pain and Congestion-ADULT-[place]

## 2023-10-12 NOTE — ASSESSMENT & PLAN NOTE
Not improving in 2 wks , start him on Z-Josesito and prednisone, his right TM is slightly hyperemic and bulging, he feels pressure in the right ear too

## 2023-10-12 NOTE — TELEPHONE ENCOUNTER
Regarding: Lingering nasal congestion w/ cough  ----- Message from Mavis Garcia sent at 10/12/2023  8:30 AM EDT -----  Patient is complaining of nasal congestion and a productive cough , no fever. He just wanted some advice, and declined to make an appointment.

## 2023-10-12 NOTE — PROGRESS NOTES
Name: Tamia Mercado      : 1975      MRN: [id number]  Encounter Provider: Mark Mirza MD  Encounter Date: 10/12/2023   Encounter department: [address] [place] [address]     1. Acute bronchitis, unspecified organism  Assessment & Plan:  Not improving in 2 wks , start him on Z-Josesito and prednisone, his right TM is slightly hyperemic and bulging, he feels pressure in the right ear too    Orders:  -     azithromycin (Zithromax) 250 mg tablet; Take 2 tablets (500 mg total) by mouth daily for 1 day, THEN 1 tablet (250 mg total) daily for 4 days. -     predniSONE 20 mg tablet; Take 2 tablets (40 mg total) by mouth daily for 5 days    2. Mild persistent asthma without complication  Assessment & Plan:  Continue Symbicort    Orders:  -     predniSONE 20 mg tablet; Take 2 tablets (40 mg total) by mouth daily for 5 days    3. JOSEE (obstructive sleep apnea)           Subjective     He has been sick for last 2 weeks, started with nasal congestion chest congestion cough sore throat, no fever, he is asthmatic and use CPAP machine, is not feeling better and he gets bouts of cough he is on Symbicort but not using any albuterol inhaler      Review of Systems   Constitutional: Negative. HENT:  Positive for congestion, sinus pressure, sneezing and sore throat. Respiratory:  Positive for cough. Negative for wheezing. Cardiovascular: Negative. Gastrointestinal: Negative. Musculoskeletal: Negative.         Past Medical History:   Diagnosis Date   • Benign essential hypertension 2006   • Cellulitis of ankle    • Hyperlipidemia 2006   • Sting from hornet, wasp, or bee      Past Surgical History:   Procedure Laterality Date   • HERNIA REPAIR       Family History   Problem Relation Age of Onset   • Sleep apnea Father         Adult   • Atrial fibrillation Father    • Stroke Father      Social History     Socioeconomic History   • Marital status: /Civil Union     Spouse name: None   • Number of children: None   • Years of education: None   • Highest education level: None   Occupational History   • None   Tobacco Use   • Smoking status: Former   • Smokeless tobacco: Never   Substance and Sexual Activity   • Alcohol use: Yes     Comment: Rare use   • Drug use: No   • Sexual activity: None   Other Topics Concern   • None   Social History Narrative    Marital history - currently      Social Determinants of Health     Financial Resource Strain: Not on file   Food Insecurity: Not on file   Transportation Needs: Not on file   Physical Activity: Not on file   Stress: Not on file   Social Connections: Not on file   Intimate Partner Violence: Not on file   Housing Stability: Not on file     Current Outpatient Medications on File Prior to Visit   Medication Sig   • albuterol (PROVENTIL HFA,VENTOLIN HFA) 90 mcg/act inhaler inhale 2 puffs by mouth every 6 hours if needed for wheezing   • amLODIPine-valsartan (EXFORGE)  MG per tablet take 1 tablet by mouth once daily   • aspirin 81 MG tablet Take 1 tablet by mouth daily   • atorvastatin (LIPITOR) 40 mg tablet take 1 tablet by mouth once daily   • buPROPion (WELLBUTRIN XL) 300 mg 24 hr tablet Take 1 tablet (300 mg total) by mouth daily   • methocarbamol (ROBAXIN) 500 mg tablet Take 1 tablet (500 mg total) by mouth 2 (two) times a day   • Symbicort 160-4.5 MCG/ACT inhaler inhale 2 puffs by mouth INTO THE LUNGS twice a day Rinse mouth after use     Allergies   Allergen Reactions   • Fish Oil - Food Allergy    • Oxycodone-Acetaminophen      Reaction Date: 12Jun2012;      Immunization History   Administered Date(s) Administered   • COVID-19 J&J ([employer]any) vaccine 0.5 mL 09/12/2021   • COVID-19 MODERNA VACC 0.5 ML IM 02/27/2022   • INFLUENZA 11/14/2015, 01/07/2018, 10/20/2018   • Influenza Injectable, MDCK, Preservative Free, Quadrivalent 11/03/2019   • Influenza Injectable, MDCK, Preservative Free, Quadrivalent, 0.5 mL 10/20/2018   • Influenza Quadrivalent Preservative Free 3 years and older IM 10/22/2016   • Pneumococcal Conjugate 13-Valent 02/02/2016   • Pneumococcal Polysaccharide PPV23 01/23/2020   • TD (adult) Preservative Free 06/20/2018   • Tdap 07/03/2006       Objective     /80   Pulse 84   Temp (!) 97.4 °F (36.3 °C)   Ht 5' 11" (1.803 m)   Wt 94.3 kg (208 lb)   SpO2 97%   BMI 29.01 kg/m²     Physical Exam  Constitutional:       Appearance: Normal appearance. He is not ill-appearing. HENT:      Ears:      Comments: Rt tm bulging , hyperemic      Nose: Congestion present. Mouth/Throat:      Pharynx: Posterior oropharyngeal erythema present. Eyes:      Extraocular Movements: Extraocular movements intact. Cardiovascular:      Rate and Rhythm: Normal rate. Heart sounds: No murmur heard. Pulmonary:      Effort: Pulmonary effort is normal.   Musculoskeletal:      Cervical back: Normal range of motion. Neurological:      Mental Status: He is alert.        Danielle Roe MD

## 2023-10-22 LAB
ALBUMIN SERPL-MCNC: 4.1 G/DL (ref 3.6–5.1)
ALBUMIN/GLOB SERPL: 1.8 (CALC) (ref 1–2.5)
ALP SERPL-CCNC: 45 U/L (ref 36–130)
ALT SERPL-CCNC: 19 U/L (ref 9–46)
AST SERPL-CCNC: 12 U/L (ref 10–40)
BASOPHILS # BLD AUTO: 20 CELLS/UL (ref 0–200)
BASOPHILS NFR BLD AUTO: 0.3 %
BILIRUB SERPL-MCNC: 0.6 MG/DL (ref 0.2–1.2)
BUN SERPL-MCNC: 18 MG/DL (ref 7–25)
BUN/CREAT SERPL: ABNORMAL (CALC) (ref 6–22)
CALCIUM SERPL-MCNC: 9 MG/DL (ref 8.6–10.3)
CHLORIDE SERPL-SCNC: 106 MMOL/L (ref 98–110)
CHOLEST SERPL-MCNC: 162 MG/DL
CHOLEST/HDLC SERPL: 4.4 (CALC)
CO2 SERPL-SCNC: 27 MMOL/L (ref 20–32)
CREAT SERPL-MCNC: 0.87 MG/DL (ref 0.6–1.29)
EOSINOPHIL # BLD AUTO: 188 CELLS/UL (ref 15–500)
EOSINOPHIL NFR BLD AUTO: 2.8 %
ERYTHROCYTE [DISTWIDTH] IN BLOOD BY AUTOMATED COUNT: 12.5 % (ref 11–15)
GFR/BSA.PRED SERPLBLD CYS-BASED-ARV: 106 ML/MIN/1.73M2
GLOBULIN SER CALC-MCNC: 2.3 G/DL (CALC) (ref 1.9–3.7)
GLUCOSE SERPL-MCNC: 108 MG/DL (ref 65–99)
HBA1C MFR BLD: 5.6 % OF TOTAL HGB
HCT VFR BLD AUTO: 42.4 % (ref 38.5–50)
HDLC SERPL-MCNC: 37 MG/DL
HGB BLD-MCNC: 14.5 G/DL (ref 13.2–17.1)
LDLC SERPL CALC-MCNC: 100 MG/DL (CALC)
LYMPHOCYTES # BLD AUTO: 1702 CELLS/UL (ref 850–3900)
LYMPHOCYTES NFR BLD AUTO: 25.4 %
MCH RBC QN AUTO: 32.7 PG (ref 27–33)
MCHC RBC AUTO-ENTMCNC: 34.2 G/DL (ref 32–36)
MCV RBC AUTO: 95.7 FL (ref 80–100)
MONOCYTES # BLD AUTO: 570 CELLS/UL (ref 200–950)
MONOCYTES NFR BLD AUTO: 8.5 %
NEUTROPHILS # BLD AUTO: 4221 CELLS/UL (ref 1500–7800)
NEUTROPHILS NFR BLD AUTO: 63 %
NONHDLC SERPL-MCNC: 125 MG/DL (CALC)
PLATELET # BLD AUTO: 199 THOUSAND/UL (ref 140–400)
PMV BLD REES-ECKER: 10.4 FL (ref 7.5–12.5)
POTASSIUM SERPL-SCNC: 3.9 MMOL/L (ref 3.5–5.3)
PROT SERPL-MCNC: 6.4 G/DL (ref 6.1–8.1)
RBC # BLD AUTO: 4.43 MILLION/UL (ref 4.2–5.8)
SODIUM SERPL-SCNC: 137 MMOL/L (ref 135–146)
TRIGL SERPL-MCNC: 153 MG/DL
WBC # BLD AUTO: 6.7 THOUSAND/UL (ref 3.8–10.8)

## 2023-10-30 ENCOUNTER — OFFICE VISIT (OUTPATIENT)
Dept: FAMILY MEDICINE CLINIC | Facility: CLINIC | Age: 48
End: 2023-10-30
Payer: COMMERCIAL

## 2023-10-30 VITALS
OXYGEN SATURATION: 97 % | HEART RATE: 82 BPM | BODY MASS INDEX: 29.12 KG/M2 | RESPIRATION RATE: 16 BRPM | DIASTOLIC BLOOD PRESSURE: 70 MMHG | HEIGHT: 71 IN | SYSTOLIC BLOOD PRESSURE: 140 MMHG | WEIGHT: 208 LBS

## 2023-10-30 DIAGNOSIS — F41.9 ANXIETY: ICD-10-CM

## 2023-10-30 DIAGNOSIS — Z12.5 SCREENING PSA (PROSTATE SPECIFIC ANTIGEN): ICD-10-CM
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
## 2023-10-30 DIAGNOSIS — S80.862A TICK BITE OF LEFT LOWER LEG, INITIAL ENCOUNTER: ICD-10-CM

## 2023-10-30 DIAGNOSIS — R73.01 IMPAIRED FASTING GLUCOSE: ICD-10-CM

## 2023-10-30 DIAGNOSIS — J45.30 MILD PERSISTENT ASTHMA WITHOUT COMPLICATION: ICD-10-CM

## 2023-10-30 DIAGNOSIS — G47.33 OSA (OBSTRUCTIVE SLEEP APNEA): ICD-10-CM

## 2023-10-30 DIAGNOSIS — E78.2 MIXED HYPERLIPIDEMIA: ICD-10-CM

## 2023-10-30 DIAGNOSIS — I10 ESSENTIAL HYPERTENSION: Primary | ICD-10-CM

## 2023-10-30 PROCEDURE — 99214 OFFICE O/P EST MOD 30 MIN: CPT | Performed by: FAMILY MEDICINE

## 2023-10-30 RX ORDER — DOXYCYCLINE HYCLATE 100 MG/1
100 CAPSULE ORAL EVERY 12 HOURS SCHEDULED
Qty: 28 CAPSULE | Refills: 0 | Status: SHIPPED | OUTPATIENT
Start: 2023-10-30 | End: 2023-11-13

## 2023-10-30 NOTE — ASSESSMENT & PLAN NOTE
Recently flareup on his asthma because of the bronchitis, he still has rhonchi and wheezing on the right side, will use the Symbicort twice a day at least for 1 to 2 weeks as he is using only once a day

## 2023-10-30 NOTE — PROGRESS NOTES
Name: Mendel Calamity      : 1975      MRN: [id number]  Encounter Provider: Leonel Reardon MD  Encounter Date: 10/30/2023   Encounter department: [address] [place] [address]     1. Essential hypertension  Assessment & Plan:  - Patient states he continues to exercise. Encouraged low salt diet. - BP remains stable. Orders:  -     CBC and differential; Future; Expected date: 2024  -     Comprehensive metabolic panel; Future; Expected date: 2024  -     Lipid panel; Future; Expected date: 2024  -     TSH, 3rd generation; Future; Expected date: 2024    2. Mixed hyperlipidemia  Assessment & Plan:  - Patient's lipid panel from 10/21 showed triglycerides of 153 and LDL of 100. - Patient adherent to atorvastatin 40 mg daily.  - Encouraged patient to eat low fat diet and continue with exercising. Continue to monitor lipids    Orders:  -     Lipid panel; Future; Expected date: 2024  -     TSH, 3rd generation; Future; Expected date: 2024    3. Tick bite of left lower leg, initial encounter  Assessment & Plan:  - Patient states he had a tick bite a couple of days ago that he removed. Red rash remains on medial aspect of LLE.  - Start patient on 2 week course of doxycyline for treatment of engorged tick. Orders:  -     doxycycline hyclate (VIBRAMYCIN) 100 mg capsule; Take 1 capsule (100 mg total) by mouth every 12 (twelve) hours for 14 days    4. Impaired fasting glucose  Assessment & Plan:  - Patient A1C from 10/21 is 5.6  - Encouraged to continue eating low sugar/carb diet. 5. Screening PSA (prostate specific antigen)  Assessment & Plan:  - Patient is agreeable to completing screening for PSA. Orders:  -     PSA, Total Screen; Future; Expected date: 2024    6. JOSEE (obstructive sleep apnea)  Assessment & Plan:  - Continue to use CPAP machine nightly. 7. Anxiety  Assessment & Plan:   stable on Wellbutrin      8.  Mild persistent asthma without complication  Assessment & Plan:  Recently flareup on his asthma because of the bronchitis, he still has rhonchi and wheezing on the right side, will use the Symbicort twice a day at least for 1 to 2 weeks as he is using only once a day      BMI Counseling: Body mass index is 29.01 kg/m². The BMI is above normal. Exercise recommendations include exercising 3-5 times per week. Rationale for BMI follow-up plan is due to patient being overweight or obese. Depression Screening and Follow-up Plan: Patient was screened for depression during today's encounter. They screened negative with a PHQ-2 score of 0. Subjective     Patient with history of HLD and Asthma here for a follow up visit, last seen in the office on 10/12. Patient appears well today and is in NAD. He states he has been exercising and trying to eat healthier. He had a recent episode of acute bronchitis, and states he has some remaining congestion and cough. He is using his Symbicort once daily. Denies SOB, wheezing, palpitations, and chest pain. He states he got a tick bite a couple of days ago on the medial aspect of his left lower leg. He states that it was engorged and he removed it, but states a circular red rash remained. Review of Systems   Constitutional:  Negative for activity change, appetite change, chills, fatigue, fever and unexpected weight change. HENT:  Negative for congestion, ear discharge, ear pain, nosebleeds, postnasal drip, rhinorrhea, sinus pressure, sneezing, sore throat, trouble swallowing and voice change. Eyes:  Negative for photophobia, pain, discharge, redness and itching. Respiratory:  Negative for cough, chest tightness, shortness of breath and wheezing. Cardiovascular:  Negative for chest pain, palpitations and leg swelling. Gastrointestinal:  Negative for abdominal pain, constipation, diarrhea, nausea and vomiting. Endocrine: Negative for polyuria.    Genitourinary:  Negative for dysuria, frequency and urgency. Musculoskeletal:  Negative for arthralgias, back pain, myalgias and neck pain. Skin:  Negative for color change, pallor and rash. Allergic/Immunologic: Negative for environmental allergies and food allergies. Neurological:  Negative for dizziness, weakness, light-headedness and headaches. Hematological:  Negative for adenopathy. Does not bruise/bleed easily. Psychiatric/Behavioral:  Negative for behavioral problems. The patient is not nervous/anxious.         Past Medical History:   Diagnosis Date   • Benign essential hypertension 03/28/2006   • Cellulitis of ankle    • Hyperlipidemia 03/28/2006   • Sting from hornet, wasp, or bee      Past Surgical History:   Procedure Laterality Date   • HERNIA REPAIR       Family History   Problem Relation Age of Onset   • Sleep apnea Father         Adult   • Atrial fibrillation Father    • Stroke Father      Social History     Socioeconomic History   • Marital status: /Civil Union     Spouse name: None   • Number of children: None   • Years of education: None   • Highest education level: None   Occupational History   • None   Tobacco Use   • Smoking status: Former   • Smokeless tobacco: Never   Substance and Sexual Activity   • Alcohol use: Yes     Comment: Rare use   • Drug use: No   • Sexual activity: None   Other Topics Concern   • None   Social History Narrative    Marital history - currently      Social Determinants of Health     Financial Resource Strain: Not on file   Food Insecurity: Not on file   Transportation Needs: Not on file   Physical Activity: Not on file   Stress: Not on file   Social Connections: Not on file   Intimate Partner Violence: Not on file   Housing Stability: Not on file     Current Outpatient Medications on File Prior to Visit   Medication Sig   • albuterol (PROVENTIL HFA,VENTOLIN HFA) 90 mcg/act inhaler inhale 2 puffs by mouth every 6 hours if needed for wheezing   • amLODIPine-valsartan (EXFORGE)  MG per tablet take 1 tablet by mouth once daily   • aspirin 81 MG tablet Take 1 tablet by mouth daily   • atorvastatin (LIPITOR) 40 mg tablet take 1 tablet by mouth once daily   • buPROPion (WELLBUTRIN XL) 300 mg 24 hr tablet Take 1 tablet (300 mg total) by mouth daily   • methocarbamol (ROBAXIN) 500 mg tablet Take 1 tablet (500 mg total) by mouth 2 (two) times a day   • Symbicort 160-4.5 MCG/ACT inhaler inhale 2 puffs by mouth INTO THE LUNGS twice a day Rinse mouth after use     Allergies   Allergen Reactions   • Fish Oil - Food Allergy    • Oxycodone-Acetaminophen      Reaction Date: 12Jun2012;      Immunization History   Administered Date(s) Administered   • COVID-19 J&J ([email or web address]) vaccine 0.5 mL 09/12/2021   • COVID-19 MODERNA VACC 0.5 ML IM 02/27/2022   • INFLUENZA 11/14/2015, 01/07/2018, 10/20/2018   • Influenza Injectable, MDCK, Preservative Free, Quadrivalent 11/03/2019   • Influenza Injectable, MDCK, Preservative Free, Quadrivalent, 0.5 mL 10/20/2018   • Influenza Quadrivalent Preservative Free 3 years and older IM 10/22/2016   • Pneumococcal Conjugate 13-Valent 02/02/2016   • Pneumococcal Polysaccharide PPV23 01/23/2020   • TD (adult) Preservative Free 06/20/2018   • Tdap 07/03/2006       Objective     /70   Pulse 82   Resp 16   Ht 5' 11" (1.803 m)   Wt 94.3 kg (208 lb)   SpO2 97%   BMI 29.01 kg/m²     Physical Exam  Vitals and nursing note reviewed. Constitutional:       General: He is not in acute distress. Appearance: Normal appearance. He is normal weight. He is not ill-appearing or toxic-appearing. HENT:      Nose: Congestion present. Eyes:      Pupils: Pupils are equal, round, and reactive to light. Cardiovascular:      Rate and Rhythm: Normal rate and regular rhythm. Heart sounds: No murmur heard. Pulmonary:      Effort: Pulmonary effort is normal. No respiratory distress. Breath sounds: Rhonchi present.       Comments: Slight wheeze on rt lower side Musculoskeletal:      Right lower leg: No edema. Left lower leg: No edema. Skin:     Comments: Rash noted on medial aspect of left lower extremity. It is circular about 1 cm and central clear area    Neurological:      General: No focal deficit present. Mental Status: He is alert.    Psychiatric:         Mood and Affect: Mood normal.       Deepa Wells MD

## 2023-10-30 NOTE — ASSESSMENT & PLAN NOTE
- Patient states he had a tick bite a couple of days ago that he removed. Red rash remains on medial aspect of LLE.  - Start patient on 2 week course of doxycyline for treatment of engorged tick.

## 2023-10-30 NOTE — ASSESSMENT & PLAN NOTE
- Patient's lipid panel from 10/21 showed triglycerides of 153 and LDL of 100. - Patient adherent to atorvastatin 40 mg daily.  - Encouraged patient to eat low fat diet and continue with exercising.  Continue to monitor lipids

## 2023-12-03 DIAGNOSIS — I10 ESSENTIAL HYPERTENSION: ICD-10-CM

## 2023-12-03 DIAGNOSIS — J45.20 MILD INTERMITTENT ASTHMA WITHOUT COMPLICATION: ICD-10-CM

## 2023-12-04 RX ORDER — AMLODIPINE AND VALSARTAN 10; 160 MG/1; MG/1
1 TABLET ORAL DAILY
Qty: 90 TABLET | Refills: 1 | Status: SHIPPED | OUTPATIENT
Start: 2023-12-04

## 2023-12-04 RX ORDER — ALBUTEROL SULFATE 90 UG/1
2 AEROSOL, METERED RESPIRATORY (INHALATION) EVERY 6 HOURS PRN
Qty: 8.5 G | Refills: 1 | Status: SHIPPED | OUTPATIENT
Start: 2023-12-04

## 2023-12-29 PROBLEM — Z12.5 SCREENING PSA (PROSTATE SPECIFIC ANTIGEN): Status: RESOLVED | Noted: 2020-07-28 | Resolved: 2023-12-29

## 2024-02-07 DIAGNOSIS — E78.2 MIXED HYPERLIPIDEMIA: ICD-10-CM

## 2024-02-07 RX ORDER — ATORVASTATIN CALCIUM 40 MG/1
40 TABLET, FILM COATED ORAL DAILY
Qty: 90 TABLET | Refills: 1 | Status: SHIPPED | OUTPATIENT
Start: 2024-02-07

## 2024-03-01 DIAGNOSIS — F41.9 ANXIETY: ICD-10-CM

## 2024-03-01 RX ORDER — BUPROPION HYDROCHLORIDE 300 MG/1
300 TABLET ORAL DAILY
Qty: 90 TABLET | Refills: 1 | Status: SHIPPED | OUTPATIENT
Start: 2024-03-01

## 2024-03-25 ENCOUNTER — TELEPHONE (OUTPATIENT)
Age: 49
End: 2024-03-25

## 2024-03-25 DIAGNOSIS — J45.20 MILD INTERMITTENT ASTHMA WITHOUT COMPLICATION: ICD-10-CM

## 2024-03-25 RX ORDER — BUDESONIDE AND FORMOTEROL FUMARATE DIHYDRATE 160; 4.5 UG/1; UG/1
AEROSOL RESPIRATORY (INHALATION)
Qty: 10.2 G | Refills: 1 | Status: SHIPPED | OUTPATIENT
Start: 2024-03-25

## 2024-03-25 NOTE — TELEPHONE ENCOUNTER
Patient called and stated he received a letter from his insurance company that they will not approve Symbicort unless a prior authorization is received.  Refill request also submitted today.  Please advise patient of any updates on prior authorization.  Thank you!

## 2024-03-25 NOTE — TELEPHONE ENCOUNTER
Medication: Symbicort 160-4.5 MCG/ACT inhaler     Dose/Frequency: As Directed /  inhale 2 puffs by mouth INTO THE LUNGS twice a day Rinse mouth after use     Quantity: 10.2 g    Pharmacy: [address] [place] - [facility]     Office:   [x] PCP/Provider -   [] Speciality/Provider -     Does the patient have enough for 3 days?   [x] Yes   [] No - Send as HP to POD

## 2024-03-28 NOTE — TELEPHONE ENCOUNTER
PA for Symbicort 160-4.5 MCG/ACT inhaler    Submitted via    [][email or web address]KEY   [x][employer]-Case ID # [id number]  []Faxed to plan   []Other website   []Phone call Case ID #     Office notes sent, clinical questions answered. Awaiting determination    Turnaround time for your insurance to make a decision on your Prior Authorization can take 7-21 business days.

## 2024-04-03 NOTE — TELEPHONE ENCOUNTER
PA for Symbicort 160-4.5 MCG/ACT inhaler Approved     Date(s) approved 3- - 3-        Patient advised by [x] "[employer]t Message                      [] Phone call       Pharmacy advised by [x]Fax                                     []Phone call    Approval letter scanned into Media Yes

## 2024-05-05 LAB
ALBUMIN SERPL-MCNC: 4.5 G/DL (ref 3.6–5.1)
ALBUMIN/GLOB SERPL: 2 (CALC) (ref 1–2.5)
ALP SERPL-CCNC: 45 U/L (ref 36–130)
ALT SERPL-CCNC: 26 U/L (ref 9–46)
AST SERPL-CCNC: 22 U/L (ref 10–40)
BASOPHILS # BLD AUTO: 7 CELLS/UL (ref 0–200)
BASOPHILS NFR BLD AUTO: 0.1 %
BILIRUB SERPL-MCNC: 0.5 MG/DL (ref 0.2–1.2)
BUN SERPL-MCNC: 15 MG/DL (ref 7–25)
BUN/CREAT SERPL: ABNORMAL (CALC) (ref 6–22)
CALCIUM SERPL-MCNC: 9.3 MG/DL (ref 8.6–10.3)
CHLORIDE SERPL-SCNC: 106 MMOL/L (ref 98–110)
CHOLEST SERPL-MCNC: 168 MG/DL
CHOLEST/HDLC SERPL: 4.8 (CALC)
CO2 SERPL-SCNC: 26 MMOL/L (ref 20–32)
CREAT SERPL-MCNC: 0.97 MG/DL (ref 0.6–1.29)
EOSINOPHIL # BLD AUTO: 142 CELLS/UL (ref 15–500)
EOSINOPHIL NFR BLD AUTO: 2 %
ERYTHROCYTE [DISTWIDTH] IN BLOOD BY AUTOMATED COUNT: 12.3 % (ref 11–15)
GFR/BSA.PRED SERPLBLD CYS-BASED-ARV: 96 ML/MIN/1.73M2
GLOBULIN SER CALC-MCNC: 2.3 G/DL (CALC) (ref 1.9–3.7)
GLUCOSE SERPL-MCNC: 109 MG/DL (ref 65–99)
HCT VFR BLD AUTO: 43.5 % (ref 38.5–50)
HDLC SERPL-MCNC: 35 MG/DL
HGB BLD-MCNC: 14.9 G/DL (ref 13.2–17.1)
LDLC SERPL CALC-MCNC: 108 MG/DL (CALC)
LYMPHOCYTES # BLD AUTO: 1512 CELLS/UL (ref 850–3900)
LYMPHOCYTES NFR BLD AUTO: 21.3 %
MCH RBC QN AUTO: 32.5 PG (ref 27–33)
MCHC RBC AUTO-ENTMCNC: 34.3 G/DL (ref 32–36)
MCV RBC AUTO: 95 FL (ref 80–100)
MONOCYTES # BLD AUTO: 575 CELLS/UL (ref 200–950)
MONOCYTES NFR BLD AUTO: 8.1 %
NEUTROPHILS # BLD AUTO: 4864 CELLS/UL (ref 1500–7800)
NEUTROPHILS NFR BLD AUTO: 68.5 %
NONHDLC SERPL-MCNC: 133 MG/DL (CALC)
PLATELET # BLD AUTO: 185 THOUSAND/UL (ref 140–400)
PMV BLD REES-ECKER: 10.8 FL (ref 7.5–12.5)
POTASSIUM SERPL-SCNC: 3.9 MMOL/L (ref 3.5–5.3)
PROT SERPL-MCNC: 6.8 G/DL (ref 6.1–8.1)
PSA SERPL-MCNC: 0.32 NG/ML
RBC # BLD AUTO: 4.58 MILLION/UL (ref 4.2–5.8)
SODIUM SERPL-SCNC: 140 MMOL/L (ref 135–146)
TRIGL SERPL-MCNC: 135 MG/DL
TSH SERPL-ACNC: 1.2 MIU/L (ref 0.4–4.5)
WBC # BLD AUTO: 7.1 THOUSAND/UL (ref 3.8–10.8)

## 2024-05-08 ENCOUNTER — OFFICE VISIT (OUTPATIENT)
Dept: FAMILY MEDICINE CLINIC | Facility: CLINIC | Age: 49
End: 2024-05-08
Payer: COMMERCIAL

## 2024-05-08 VITALS
OXYGEN SATURATION: 95 % | WEIGHT: 213 LBS | HEART RATE: 93 BPM | HEIGHT: 71 IN | DIASTOLIC BLOOD PRESSURE: 70 MMHG | SYSTOLIC BLOOD PRESSURE: 124 MMHG | RESPIRATION RATE: 16 BRPM | BODY MASS INDEX: 29.82 KG/M2

## 2024-05-08 DIAGNOSIS — R73.01 IMPAIRED FASTING GLUCOSE: ICD-10-CM

## 2024-05-08 DIAGNOSIS — I10 ESSENTIAL HYPERTENSION: ICD-10-CM

## 2024-05-08 DIAGNOSIS — Z00.00 ANNUAL PHYSICAL EXAM: Primary | ICD-10-CM

## 2024-05-08 DIAGNOSIS — E78.2 MIXED HYPERLIPIDEMIA: ICD-10-CM

## 2024-05-08 DIAGNOSIS — F41.9 ANXIETY: ICD-10-CM

## 2024-05-08 DIAGNOSIS — J45.30 MILD PERSISTENT ASTHMA WITHOUT COMPLICATION: ICD-10-CM

## 2024-05-08 DIAGNOSIS — J45.20 MILD INTERMITTENT ASTHMA WITHOUT COMPLICATION: ICD-10-CM

## 2024-05-08 DIAGNOSIS — G47.33 OSA (OBSTRUCTIVE SLEEP APNEA): ICD-10-CM

## 2024-05-08 PROBLEM — S80.862A TICK BITE OF LEFT LOWER LEG: Status: RESOLVED | Noted: 2023-10-30 | Resolved: 2024-05-08
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
## 2024-05-08 PROBLEM — J20.9 ACUTE BRONCHITIS: Status: RESOLVED | Noted: 2023-10-12 | Resolved: 2024-05-08

## 2024-05-08 PROCEDURE — 99396 PREV VISIT EST AGE 40-64: CPT | Performed by: FAMILY MEDICINE

## 2024-05-08 NOTE — PROGRESS NOTES
ADULT ANNUAL PHYSICAL  [facility] [place]    NAME: Mk Estrada  AGE: 48 y.o. SEX: male  : 1975     DATE: 2024     Assessment and Plan:     Problem List Items Addressed This Visit        Cardiovascular and Mediastinum    Essential hypertension     Hypertension is stable, continue low-salt diet, continue same medications.           Relevant Orders    CBC and differential    Comprehensive metabolic panel    Lipid panel    Hemoglobin A1C       Respiratory    JOSEE (obstructive sleep apnea)     Sleeps well, with the CPAP machine and follows sleep specialist once a year         Asthma     Use Symbicort only 1 puff daily, asthma under control         RESOLVED: Mild intermittent asthma without complication       Endocrine    Impaired fasting glucose     He remains borderline impaired fasting glucose, will check A1c next time         Relevant Orders    Hemoglobin A1C       Behavioral Health    Anxiety     Well-controlled anxiety on Wellbutrin            Other    Mixed hyperlipidemia     Cholesterol remains stable ASCVD risk 4.9%, on statins and continue low-fat diet         Annual physical exam - Primary    BMI 29.0-29.9,adult       Immunizations and preventive care screenings were discussed with patient today. Appropriate education was printed on patient's after visit summary.  PSA normal    The role of the digital rectal examination in prostate cancer screening was also discussed and I discussed with him that there is large interobserver variability in the findings of digital rectal examination.    Counseling:  Dental Health: discussed importance of regular tooth brushing, flossing, and dental visits.  Exercise: the importance of regular exercise/physical activity was discussed. Recommend exercise 3-5 times per week for at least 30 minutes.       Depression Screening and Follow-up Plan: Patient was screened for depression during today's encounter. They  screened negative with a PHQ-2 score of 0.        No follow-ups on file.     Chief Complaint:     Chief Complaint   Patient presents with   • Annual Exam      History of Present Illness:     Adult Annual Physical   Patient here for a comprehensive physical exam. The patient reports no problems.    Diet and Physical Activity  Diet/Nutrition: well balanced diet.   Exercise: moderate cardiovascular exercise.      Depression Screening  PHQ-2/9 Depression Screening    Little interest or pleasure in doing things: 0 - not at all  Feeling down, depressed, or hopeless: 0 - not at all  PHQ-2 Score: 0  PHQ-2 Interpretation: Negative depression screen       General Health  Sleep: sleeps well.   Hearing: normal - bilateral.  Vision: wears contacts.   Dental: no dental visits for >1 year.        Health  Symptoms include: none     Review of Systems:     Review of Systems   Constitutional:  Negative for activity change, appetite change, chills, fatigue, fever and unexpected weight change.   HENT:  Negative for congestion, ear discharge, ear pain, nosebleeds, postnasal drip, rhinorrhea, sinus pressure, sneezing, sore throat, trouble swallowing and voice change.    Eyes:  Negative for photophobia, pain, discharge, redness and itching.   Respiratory:  Negative for cough, chest tightness, shortness of breath and wheezing.    Cardiovascular:  Negative for chest pain, palpitations and leg swelling.   Gastrointestinal:  Negative for abdominal pain, constipation, diarrhea, nausea and vomiting.   Endocrine: Negative for polyuria.   Genitourinary:  Negative for dysuria, frequency and urgency.   Musculoskeletal:  Negative for arthralgias, back pain, myalgias and neck pain.   Skin:  Negative for color change, pallor and rash.   Allergic/Immunologic: Negative for environmental allergies and food allergies.   Neurological:  Negative for dizziness, weakness, light-headedness and headaches.   Hematological:  Negative for adenopathy. Does not  bruise/bleed easily.   Psychiatric/Behavioral:  Negative for behavioral problems. The patient is not nervous/anxious.       Past Medical History:     Past Medical History:   Diagnosis Date   • Benign essential hypertension 03/28/2006   • Cellulitis of ankle    • Hyperlipidemia 03/28/2006   • Sting from hornet, wasp, or bee       Past Surgical History:     Past Surgical History:   Procedure Laterality Date   • HERNIA REPAIR        Family History:     Family History   Problem Relation Age of Onset   • Sleep apnea Father         Adult   • Atrial fibrillation Father    • Stroke Father       Social History:     Social History     Socioeconomic History   • Marital status: /Civil Union     Spouse name: None   • Number of children: None   • Years of education: None   • Highest education level: None   Occupational History   • None   Tobacco Use   • Smoking status: Former   • Smokeless tobacco: Never   Substance and Sexual Activity   • Alcohol use: Yes     Comment: Rare use   • Drug use: No   • Sexual activity: None   Other Topics Concern   • None   Social History Narrative    Marital history - currently      Social Determinants of Health     Financial Resource Strain: Not on file   Food Insecurity: Not on file   Transportation Needs: Not on file   Physical Activity: Not on file   Stress: Not on file   Social Connections: Not on file   Intimate Partner Violence: Not on file   Housing Stability: Not on file      Current Medications:     Current Outpatient Medications   Medication Sig Dispense Refill   • albuterol (PROVENTIL HFA,VENTOLIN HFA) 90 mcg/act inhaler Inhale 2 puffs every 6 (six) hours as needed for wheezing 8.5 g 1   • amLODIPine-valsartan (EXFORGE)  MG per tablet Take 1 tablet by mouth daily 90 tablet 1   • aspirin 81 MG tablet Take 1 tablet by mouth daily     • atorvastatin (LIPITOR) 40 mg tablet Take 1 tablet (40 mg total) by mouth daily 90 tablet 1   • buPROPion (WELLBUTRIN XL) 300 mg 24 hr  "tablet Take 1 tablet (300 mg total) by mouth daily 90 tablet 1   • methocarbamol (ROBAXIN) 500 mg tablet Take 1 tablet (500 mg total) by mouth 2 (two) times a day 20 tablet 0   • Symbicort 160-4.5 MCG/ACT inhaler Rinse mouth after use. 10.2 g 1     No current facility-administered medications for this visit.      Allergies:     Allergies   Allergen Reactions   • Fish Oil - Food Allergy    • Oxycodone-Acetaminophen      Reaction Date: 12Jun2012;       Physical Exam:     /70   Pulse 93   Resp 16   Ht 5' 11\" (1.803 m)   Wt 96.6 kg (213 lb)   SpO2 95%   BMI 29.71 kg/m²     Physical Exam  Vitals and nursing note reviewed.   Constitutional:       General: He is not in acute distress.     Appearance: Normal appearance.   HENT:      Head: Normocephalic and atraumatic.      Right Ear: Tympanic membrane and ear canal normal.      Left Ear: Tympanic membrane and ear canal normal.      Nose: Nose normal. No rhinorrhea.      Mouth/Throat:      Mouth: Mucous membranes are moist.      Pharynx: Oropharynx is clear. No oropharyngeal exudate or posterior oropharyngeal erythema.   Eyes:      Extraocular Movements: Extraocular movements intact.      Conjunctiva/sclera: Conjunctivae normal.      Pupils: Pupils are equal, round, and reactive to light.   Cardiovascular:      Rate and Rhythm: Normal rate and regular rhythm.      Heart sounds: No murmur heard.  Pulmonary:      Effort: Pulmonary effort is normal.      Breath sounds: Normal breath sounds. No wheezing or rales.   Abdominal:      General: Abdomen is flat. Bowel sounds are normal. There is no distension.      Palpations: Abdomen is soft. There is no mass.      Tenderness: There is no abdominal tenderness. There is no guarding.   Musculoskeletal:         General: No swelling, tenderness, deformity or signs of injury. Normal range of motion.      Cervical back: Normal range of motion and neck supple.   Skin:     Coloration: Skin is not jaundiced or pale.      Findings: " No rash.   Neurological:      General: No focal deficit present.      Mental Status: He is alert and oriented to person, place, and time.      Motor: No weakness.   Psychiatric:         Mood and Affect: Mood normal.         Behavior: Behavior normal.         Thought Content: Thought content normal.         Judgment: Judgment normal.          Liv Darby MD  [facility] [place]

## 2024-06-05 DIAGNOSIS — I10 ESSENTIAL HYPERTENSION: ICD-10-CM

## 2024-06-06 RX ORDER — AMLODIPINE AND VALSARTAN 10; 160 MG/1; MG/1
1 TABLET ORAL DAILY
Qty: 90 TABLET | Refills: 1 | Status: SHIPPED | OUTPATIENT
Start: 2024-06-06

## 2024-08-02 DIAGNOSIS — E78.2 MIXED HYPERLIPIDEMIA: ICD-10-CM

## 2024-08-02 RX ORDER — ATORVASTATIN CALCIUM 40 MG/1
40 TABLET, FILM COATED ORAL DAILY
Qty: 90 TABLET | Refills: 1 | Status: SHIPPED | OUTPATIENT
Start: 2024-08-02

## 2024-09-08 DIAGNOSIS — F41.9 ANXIETY: ICD-10-CM

## 2024-09-08 RX ORDER — BUPROPION HYDROCHLORIDE 300 MG/1
300 TABLET ORAL DAILY
Qty: 90 TABLET | Refills: 1 | Status: SHIPPED | OUTPATIENT
Start: 2024-09-08

## 2024-11-10 LAB
ALBUMIN SERPL-MCNC: 4.4 G/DL (ref 3.6–5.1)
ALBUMIN/GLOB SERPL: 1.7 (CALC) (ref 1–2.5)
ALP SERPL-CCNC: 48 U/L (ref 35–144)
ALT SERPL-CCNC: 28 U/L (ref 9–46)
AST SERPL-CCNC: 18 U/L (ref 10–35)
BASOPHILS # BLD AUTO: 21 CELLS/UL (ref 0–200)
BASOPHILS NFR BLD AUTO: 0.4 %
BILIRUB SERPL-MCNC: 0.5 MG/DL (ref 0.2–1.2)
BUN SERPL-MCNC: 14 MG/DL (ref 7–25)
BUN/CREAT SERPL: ABNORMAL (CALC) (ref 6–22)
CALCIUM SERPL-MCNC: 8.9 MG/DL (ref 8.6–10.3)
CHLORIDE SERPL-SCNC: 106 MMOL/L (ref 98–110)
CHOLEST SERPL-MCNC: 169 MG/DL
CHOLEST/HDLC SERPL: 4.2 (CALC)
CO2 SERPL-SCNC: 26 MMOL/L (ref 20–32)
CREAT SERPL-MCNC: 0.9 MG/DL (ref 0.7–1.3)
EOSINOPHIL # BLD AUTO: 130 CELLS/UL (ref 15–500)
EOSINOPHIL NFR BLD AUTO: 2.5 %
ERYTHROCYTE [DISTWIDTH] IN BLOOD BY AUTOMATED COUNT: 12.7 % (ref 11–15)
GFR/BSA.PRED SERPLBLD CYS-BASED-ARV: 104 ML/MIN/1.73M2
GLOBULIN SER CALC-MCNC: 2.6 G/DL (CALC) (ref 1.9–3.7)
GLUCOSE SERPL-MCNC: 106 MG/DL (ref 65–99)
HBA1C MFR BLD: 5.8 % OF TOTAL HGB
HCT VFR BLD AUTO: 44.6 % (ref 38.5–50)
HDLC SERPL-MCNC: 40 MG/DL
HGB BLD-MCNC: 15 G/DL (ref 13.2–17.1)
LDLC SERPL CALC-MCNC: 108 MG/DL (CALC)
LYMPHOCYTES # BLD AUTO: 1628 CELLS/UL (ref 850–3900)
LYMPHOCYTES NFR BLD AUTO: 31.3 %
MCH RBC QN AUTO: 32.5 PG (ref 27–33)
MCHC RBC AUTO-ENTMCNC: 33.6 G/DL (ref 32–36)
MCV RBC AUTO: 96.7 FL (ref 80–100)
MONOCYTES # BLD AUTO: 536 CELLS/UL (ref 200–950)
MONOCYTES NFR BLD AUTO: 10.3 %
NEUTROPHILS # BLD AUTO: 2886 CELLS/UL (ref 1500–7800)
NEUTROPHILS NFR BLD AUTO: 55.5 %
NONHDLC SERPL-MCNC: 129 MG/DL (CALC)
PLATELET # BLD AUTO: 186 THOUSAND/UL (ref 140–400)
PMV BLD REES-ECKER: 10.5 FL (ref 7.5–12.5)
POTASSIUM SERPL-SCNC: 4 MMOL/L (ref 3.5–5.3)
PROT SERPL-MCNC: 7 G/DL (ref 6.1–8.1)
RBC # BLD AUTO: 4.61 MILLION/UL (ref 4.2–5.8)
SODIUM SERPL-SCNC: 141 MMOL/L (ref 135–146)
TRIGL SERPL-MCNC: 118 MG/DL
WBC # BLD AUTO: 5.2 THOUSAND/UL (ref 3.8–10.8)

## 2024-11-14 ENCOUNTER — OFFICE VISIT (OUTPATIENT)
Dept: FAMILY MEDICINE CLINIC | Facility: CLINIC | Age: 49
End: 2024-11-14
Payer: COMMERCIAL

## 2024-11-14 VITALS
HEART RATE: 83 BPM | HEIGHT: 71 IN | WEIGHT: 220 LBS | OXYGEN SATURATION: 97 % | DIASTOLIC BLOOD PRESSURE: 60 MMHG | RESPIRATION RATE: 16 BRPM | SYSTOLIC BLOOD PRESSURE: 120 MMHG | BODY MASS INDEX: 30.8 KG/M2

## 2024-11-14 DIAGNOSIS — I10 ESSENTIAL HYPERTENSION: Primary | ICD-10-CM

## 2024-11-14 DIAGNOSIS — E78.2 MIXED HYPERLIPIDEMIA: ICD-10-CM

## 2024-11-14 DIAGNOSIS — G47.33 OSA (OBSTRUCTIVE SLEEP APNEA): ICD-10-CM

## 2024-11-14 DIAGNOSIS — F41.9 ANXIETY: ICD-10-CM

## 2024-11-14 DIAGNOSIS — J45.30 MILD PERSISTENT ASTHMA WITHOUT COMPLICATION: ICD-10-CM

## 2024-11-14 DIAGNOSIS — R73.01 IMPAIRED FASTING GLUCOSE: ICD-10-CM

## 2024-11-14 DIAGNOSIS — J45.20 MILD INTERMITTENT ASTHMA WITHOUT COMPLICATION: ICD-10-CM

## 2024-11-14 PROCEDURE — 99214 OFFICE O/P EST MOD 30 MIN: CPT | Performed by: FAMILY MEDICINE

## 2024-11-14 RX ORDER — AMLODIPINE AND VALSARTAN 10; 160 MG/1; MG/1
1 TABLET ORAL DAILY
Qty: 90 TABLET | Refills: 1 | Status: SHIPPED | OUTPATIENT
Start: 2024-11-14

## 2024-11-14 RX ORDER — ALBUTEROL SULFATE 90 UG/1
2 INHALANT RESPIRATORY (INHALATION) EVERY 6 HOURS PRN
Qty: 8.5 G | Refills: 1 | Status: SHIPPED | OUTPATIENT
Start: 2024-11-14

## 2024-11-14 RX ORDER — FLUTICASONE PROPIONATE AND SALMETEROL 250; 50 UG/1; UG/1
1 POWDER RESPIRATORY (INHALATION) 2 TIMES DAILY
Qty: 60 BLISTER | Refills: 5 | Status: SHIPPED | OUTPATIENT
Start: 2024-11-14

## 2024-11-14 NOTE — ASSESSMENT & PLAN NOTE
Blood pressure well-controlled continue amlodipine valsartan  Orders:    amLODIPine-valsartan (EXFORGE)  MG per tablet; Take 1 tablet by mouth daily    Comprehensive metabolic panel; Future    Lipid panel; Future    Lab Results   Component Value Date    CREATININE 0.90 11/09/2024

## 2024-11-14 NOTE — ASSESSMENT & PLAN NOTE
LDL remains same, HDL improved continue atorvastatin 40 mg daily  Orders:    Comprehensive metabolic panel; Future    Lipid panel; Future

## 2024-11-14 NOTE — ASSESSMENT & PLAN NOTE
Asthma stable on Symbicort, he uses once a day, not well covered by insurance, will replace with Advair

## 2024-11-14 NOTE — ASSESSMENT & PLAN NOTE
Impaired glucose for a long time, continue watching diet and exercise.  Hemoglobin A1c slightly high.  Continue low sugar diet

## 2024-11-14 NOTE — PROGRESS NOTES
Name: Mk Estrada      : 1975      MRN: [id number]  Encounter Provider: Liv Darby MD  Encounter Date: 2024   Encounter department: [facility] [place]    Assessment & Plan  Essential hypertension  Blood pressure well-controlled continue amlodipine valsartan  Orders:    amLODIPine-valsartan (EXFORGE)  MG per tablet; Take 1 tablet by mouth daily    Comprehensive metabolic panel; Future    Lipid panel; Future    Lab Results   Component Value Date    CREATININE 0.90 2024       JOSEE (obstructive sleep apnea)         Mild persistent asthma without complication  Asthma stable on Symbicort, he uses once a day, not well covered by insurance, will replace with Advair       Impaired fasting glucose  Impaired glucose for a long time, continue watching diet and exercise.  Hemoglobin A1c slightly high.  Continue low sugar diet           Anxiety  Well-controlled with Wellbutrin       Mixed hyperlipidemia  LDL remains same, HDL improved continue atorvastatin 40 mg daily  Orders:    Comprehensive metabolic panel; Future    Lipid panel; Future    Mild intermittent asthma without complication    Orders:    albuterol (PROVENTIL HFA,VENTOLIN HFA) 90 mcg/act inhaler; Inhale 2 puffs every 6 (six) hours as needed for wheezing    Fluticasone-Salmeterol (Advair Diskus) 250-50 mcg/dose inhaler; Inhale 1 puff 2 (two) times a day Rinse mouth after use.       F/u 6 months   History of Present Illness     Follow-up hypertension, hyperlipidemia, asthma, using Symbicort which is not well covered by the insurance, anxiety is under control, with Wellbutrin, he is very active, no recent change in health      Review of Systems   Constitutional:  Negative for activity change, chills, fatigue and fever.   HENT:  Negative for congestion, ear discharge and ear pain.    Eyes:  Negative for photophobia, pain, discharge, redness and itching.        Wear contact    Respiratory:  Negative for cough, chest  tightness, shortness of breath and wheezing.    Cardiovascular:  Negative for chest pain, palpitations and leg swelling.   Gastrointestinal:  Negative for abdominal pain, constipation, diarrhea, nausea and vomiting.   Endocrine: Negative for polyuria.   Genitourinary:  Negative for dysuria and frequency.   Musculoskeletal:  Negative for back pain, myalgias and neck pain.   Skin:  Negative for color change, pallor and rash.   Allergic/Immunologic: Negative for environmental allergies and food allergies.   Neurological:  Negative for dizziness, weakness, light-headedness and headaches.   Hematological:  Negative for adenopathy. Does not bruise/bleed easily.   Psychiatric/Behavioral:  Negative for behavioral problems. The patient is not nervous/anxious.      Past Medical History:   Diagnosis Date    Benign essential hypertension 03/28/2006    Cellulitis of ankle     Hyperlipidemia 03/28/2006    Sting from hornet, wasp, or bee      Past Surgical History:   Procedure Laterality Date    HERNIA REPAIR       Family History   Problem Relation Age of Onset    Thyroid disease unspecified Mother     Sleep apnea Father         Adult    Atrial fibrillation Father     Stroke Father      Social History     Tobacco Use    Smoking status: Former    Smokeless tobacco: Never   Substance and Sexual Activity    Alcohol use: Yes     Comment: Rare use    Drug use: No    Sexual activity: Not on file     Current Outpatient Medications on File Prior to Visit   Medication Sig    aspirin 81 MG tablet Take 1 tablet by mouth daily    atorvastatin (LIPITOR) 40 mg tablet TAKE ONE TABLET BY MOUTH EVERY DAY    buPROPion (WELLBUTRIN XL) 300 mg 24 hr tablet TAKE ONE TABLET BY MOUTH EVERY DAY    [DISCONTINUED] albuterol (PROVENTIL HFA,VENTOLIN HFA) 90 mcg/act inhaler Inhale 2 puffs every 6 (six) hours as needed for wheezing    [DISCONTINUED] amLODIPine-valsartan (EXFORGE)  MG per tablet TAKE ONE TABLET BY MOUTH EVERY DAY    [DISCONTINUED]  "methocarbamol (ROBAXIN) 500 mg tablet Take 1 tablet (500 mg total) by mouth 2 (two) times a day    [DISCONTINUED] Symbicort 160-4.5 MCG/ACT inhaler Rinse mouth after use.     Allergies   Allergen Reactions    Fish Oil - Food Allergy     Oxycodone-Acetaminophen      Reaction Date: 12Jun2012;      Immunization History   Administered Date(s) Administered    COVID-19 J&J (99Bill) vaccine 0.5 mL 09/12/2021    COVID-19 MODERNA VACC 0.5 ML IM 02/27/2022    INFLUENZA 11/14/2015, 01/07/2018, 10/20/2018    Influenza Injectable, MDCK, Preservative Free, Quadrivalent 11/03/2019    Influenza Injectable, MDCK, Preservative Free, Quadrivalent, 0.5 mL 10/20/2018    Influenza Quadrivalent Preservative Free 3 years and older IM 10/22/2016    Pneumococcal Conjugate 13-Valent 02/02/2016    Pneumococcal Polysaccharide PPV23 01/23/2020    TD (adult) Preservative Free 06/20/2018    Tdap 07/03/2006     Objective   /60   Pulse 83   Resp 16   Ht 5' 11\" (1.803 m)   Wt 99.8 kg (220 lb)   SpO2 97%   BMI 30.68 kg/m²     Physical Exam  Vitals and nursing note reviewed.   Constitutional:       General: He is not in acute distress.     Appearance: Normal appearance.   HENT:      Head: Normocephalic and atraumatic.      Right Ear: Tympanic membrane and ear canal normal. There is no impacted cerumen.      Left Ear: Tympanic membrane and ear canal normal. There is no impacted cerumen.      Nose: Nose normal. No rhinorrhea.      Mouth/Throat:      Mouth: Mucous membranes are moist.      Pharynx: Oropharynx is clear. No oropharyngeal exudate or posterior oropharyngeal erythema.   Eyes:      Extraocular Movements: Extraocular movements intact.      Conjunctiva/sclera: Conjunctivae normal.      Pupils: Pupils are equal, round, and reactive to light.   Cardiovascular:      Rate and Rhythm: Normal rate and regular rhythm.      Heart sounds: No murmur heard.  Pulmonary:      Effort: Pulmonary effort is normal.      Breath sounds: Normal breath " sounds. No wheezing or rales.   Abdominal:      General: Abdomen is flat. There is no distension.      Palpations: Abdomen is soft. There is no mass.      Tenderness: There is no abdominal tenderness. There is no guarding.      Comments: Diastases recti   Musculoskeletal:         General: No swelling, tenderness, deformity or signs of injury. Normal range of motion.      Cervical back: Normal range of motion and neck supple.   Lymphadenopathy:      Cervical: No cervical adenopathy.   Skin:     Coloration: Skin is not jaundiced or pale.      Findings: No rash.   Neurological:      General: No focal deficit present.      Mental Status: He is alert and oriented to person, place, and time.      Motor: No weakness.   Psychiatric:         Mood and Affect: Mood normal.         Behavior: Behavior normal.         Judgment: Judgment normal.

## 2024-11-14 NOTE — ASSESSMENT & PLAN NOTE
Orders:    albuterol (PROVENTIL HFA,VENTOLIN HFA) 90 mcg/act inhaler; Inhale 2 puffs every 6 (six) hours as needed for wheezing    Fluticasone-Salmeterol (Advair Diskus) 250-50 mcg/dose inhaler; Inhale 1 puff 2 (two) times a day Rinse mouth after use.

## 2025-01-20 ENCOUNTER — OFFICE VISIT (OUTPATIENT)
Dept: SLEEP CENTER | Facility: CLINIC | Age: 50
End: 2025-01-20
Payer: COMMERCIAL

## 2025-01-20 VITALS
SYSTOLIC BLOOD PRESSURE: 120 MMHG | WEIGHT: 212 LBS | DIASTOLIC BLOOD PRESSURE: 70 MMHG | BODY MASS INDEX: 29.68 KG/M2 | HEIGHT: 71 IN

## 2025-01-20 DIAGNOSIS — G47.33 OSA (OBSTRUCTIVE SLEEP APNEA): Primary | ICD-10-CM

## 2025-01-20 DIAGNOSIS — I10 ESSENTIAL HYPERTENSION: ICD-10-CM

## 2025-01-20 DIAGNOSIS — J45.20 MILD INTERMITTENT ASTHMA WITHOUT COMPLICATION: ICD-10-CM

## 2025-01-20 DIAGNOSIS — E66.3 OVERWEIGHT (BMI 25.0-29.9): ICD-10-CM

## 2025-01-20 DIAGNOSIS — F41.9 ANXIETY: ICD-10-CM

## 2025-01-20 PROCEDURE — 99214 OFFICE O/P EST MOD 30 MIN: CPT | Performed by: INTERNAL MEDICINE

## 2025-01-20 NOTE — PROGRESS NOTES
Name: Mk Estrada      : 1975      MRN: [id number]  Encounter Provider: Alan Wadsworth MD  Encounter Date: 2025   Encounter department: [facility] [place]  :  Assessment & Plan  JOSEE (obstructive sleep apnea)    Orders:    PAP DME Resupply/Reorder    Mild intermittent asthma without complication         Anxiety         Essential hypertension         Overweight (BMI 25.0-29.9)           Problems & Comorbidities Addressed this Visit are listed above. Stable/controlled conditions reviewed in note.  Additional PLAN:   I reviewed [results of prior studies and] [physiologic data ]with the patient.   [I discussed treatment options with risks and benefits.]  Treatment with  PAP is medically necessary and Mk is agreable to continue use.   Care of equipment, methods to improve comfort using PAP and importance of compliance with therapy were discussed.  Pressure setting: continue 13-17 cmH2O using a fullface mask.    Rx provided to replace[ ] supplies and Care coordinated with DME provider.   The patient's current unit [has now] reached its 5 yr reasonable, useful life and [needs to be replaced].  He will call for a prescription few months prior to his next visit.  Encouraged to persist with strategies for weight maintenance.    Follow-up is advised in [1 year] [or sooner if needed] [to monitor progress, compliance and to adjust therapy].       History of Present Illness   HPI          Follow-Up Note - Sleep Center   Mk Estrada  49 y.o. male  :1975  MRN:[id number]  DOS:2025    CC: I saw this patient for follow-up in clinic today for Sleep disordered breathing, Coexisting Sleep and Medical Problems.  Patient received ot a  [employer] Version 2 machine from [employer] [over a year ago].. Interval changes: [none reported.]      Results of prior studies in 2018:  A diagnostic study demonstrated an AHI of 35 / hour, higher while supine with minimum oxygen  "saturation of 83% and he spent 26% of total sleep time with saturations less than 90%. There were also frequent spontaneous arousals: Index of 75 / hour. During the subsequent therapeutic study, sleep disordered breathing was sufficiently remediated with PAP at 16 cm H2O.  He was observed during stage REM but not while supine.      PFSH, Problem List, Medications & Allergies were reviewed in EMR.   He  has a past medical history of Benign essential hypertension (03/28/2006), Cellulitis of ankle, Hyperlipidemia (03/28/2006), and Sting from hornet, wasp, or bee.    He has a current medication list which includes the following prescription(s): albuterol, amlodipine-valsartan, aspirin, atorvastatin, bupropion, and fluticasone-salmeterol.    PHYSIOLOGICAL DATA REVIEW : [ ]  using PAP > 4 hours/night 100%. Estimated RL 1.1/hour with pressure of 14cm H2O [@90th/95th percentile];[ ].  INTERPRETATION: Compliance is excellent; Pressure setting is:optimal[ ]; ;     SUBJECTIVE: With respect to use of PAP, Mk  is experiencing minimal adverse effects:[ ]dry mouth/throat[ ].He derives [ ] benefit.[ ]. Is [ ]satisfied with sleep [and] [daytime function].     Sleep Routine: Mk reports getting [(Patient-Rptd) (P) 7]-8  hrs sleep; he has no difficulty initiating or maintaining sleep . He arises needing an alarm and (Patient-Rptd) (P) Usually feels refreshed.Mk (Patient-Rptd) (P) Never]reports excessive daytime sleepiness,.  He rated himself at Total score: (Patient-Rptd) (P) 1 /24 on the Radiant Sleepiness Scale.   Other issues: None reported.     Habits: Reports that he has quit smoking. He has never used smokeless tobacco.,  Reports current alcohol use.,  Reports no history of drug use., Caffeine use: moderate until  7 PM; Exercise routine: regular.      ROS: Significant for weight has been stable.  Allergies and asthma controlled.  Mood is stable on bupropion..    EXAM: /70   Ht 5' 11\" (1.803 m)  " " Wt 96.2 kg (212 lb)   BMI 29.57 kg/m²     Wt Readings from Last 3 Encounters:   01/20/25 96.2 kg (212 lb)   11/14/24 99.8 kg (220 lb)   05/08/24 96.6 kg (213 lb)      Patient is well groomed; well appearing.   CNS: Alert, orientated, speech clear & coherent  Psych: cooperative and in no distress. Mental state: Appears normal.  H&N: EOMI; NC/AT: No facial pressure marks, no rashes.    Skin/Extrem: col & hydration normal; no edema  Resp: Respiratory effort is normal  Physical findings otherwise essentially unchanged from previous.    Sincerely,     Authenticated electronically on 01/20/25   Board Certified Specialist     Portions of the record may have been created with voice recognition software. Occasional wrong word or \"sound a like\" substitutions may have occurred due to the inherent limitations of voice recognition software. There may also be notations and random deletions of words or characters from malfunctioning software. Read the chart carefully and recognize, using context, where substitutions/deletions have occurred.    Sitting and reading: (Patient-Rptd) (P) Would never doze  Watching TV: (Patient-Rptd) (P) Would never doze  Sitting, inactive in a public place (e.g. a theatre or a meeting): (Patient-Rptd) (P) Would never doze  As a passenger in a car for an hour without a break: (Patient-Rptd) (P) Would never doze  Lying down to rest in the afternoon when circumstances permit: (Patient-Rptd) (P) Slight chance of dozing  Sitting and talking to someone: (Patient-Rptd) (P) Would never doze  Sitting quietly after a lunch without alcohol: (Patient-Rptd) (P) Would never doze  In a car, while stopped for a few minutes in traffic: (Patient-Rptd) (P) Would never doze  Total score: (Patient-Rptd) (P) 1     Review of Systems  Pertinent positives/negatives included in HPI and also as noted:       Objective   /70   Ht 5' 11\" (1.803 m)   Wt 96.2 kg (212 lb)   BMI 29.57 kg/m²        Physical Exam    Data  Lab " "Results   Component Value Date    HGB 15.0 11/09/2024    HCT 44.6 11/09/2024    MCV 96.7 11/09/2024      Lab Results   Component Value Date    CALCIUM 8.9 11/09/2024     12/16/2017    K 4.0 11/09/2024    CO2 26 11/09/2024     11/09/2024    BUN 14 11/09/2024    CREATININE 0.90 11/09/2024     No results found for: \"IRON\", \"TIBC\", \"FERRITIN\"  Lab Results   Component Value Date    AST 18 11/09/2024    ALT 28 11/09/2024         "

## 2025-01-21 ENCOUNTER — TELEPHONE (OUTPATIENT)
Dept: SLEEP CENTER | Facility: CLINIC | Age: 50
End: 2025-01-21

## 2025-01-23 LAB
DME PARACHUTE DELIVERY DATE REQUESTED: NORMAL
DME PARACHUTE ITEM DESCRIPTION: NORMAL
DME PARACHUTE ORDER STATUS: NORMAL
DME PARACHUTE SUPPLIER NAME: NORMAL
DME PARACHUTE SUPPLIER PHONE: NORMAL

## 2025-02-11 DIAGNOSIS — E78.2 MIXED HYPERLIPIDEMIA: ICD-10-CM

## 2025-02-11 RX ORDER — ATORVASTATIN CALCIUM 40 MG/1
40 TABLET, FILM COATED ORAL DAILY
Qty: 90 TABLET | Refills: 1 | Status: SHIPPED | OUTPATIENT
Start: 2025-02-11

## 2025-04-06 DIAGNOSIS — F41.9 ANXIETY: ICD-10-CM

## 2025-04-07 RX ORDER — BUPROPION HYDROCHLORIDE 300 MG/1
300 TABLET ORAL DAILY
Qty: 90 TABLET | Refills: 1 | Status: SHIPPED | OUTPATIENT
Start: 2025-04-07

## 2025-05-18 LAB
ALBUMIN SERPL-MCNC: 4.5 G/DL (ref 3.6–5.1)
ALBUMIN/GLOB SERPL: 1.7 (CALC) (ref 1–2.5)
ALP SERPL-CCNC: 57 U/L (ref 35–144)
ALT SERPL-CCNC: 30 U/L (ref 9–46)
AST SERPL-CCNC: 19 U/L (ref 10–35)
BILIRUB SERPL-MCNC: 0.4 MG/DL (ref 0.2–1.2)
BUN SERPL-MCNC: 17 MG/DL (ref 7–25)
BUN/CREAT SERPL: ABNORMAL (CALC) (ref 6–22)
CALCIUM SERPL-MCNC: 9.4 MG/DL (ref 8.6–10.3)
CHLORIDE SERPL-SCNC: 105 MMOL/L (ref 98–110)
CHOLEST SERPL-MCNC: 190 MG/DL
CHOLEST/HDLC SERPL: 4.2 (CALC)
CO2 SERPL-SCNC: 31 MMOL/L (ref 20–32)
CREAT SERPL-MCNC: 0.93 MG/DL (ref 0.7–1.3)
GFR/BSA.PRED SERPLBLD CYS-BASED-ARV: 100 ML/MIN/1.73M2
GLOBULIN SER CALC-MCNC: 2.7 G/DL (CALC) (ref 1.9–3.7)
GLUCOSE SERPL-MCNC: 103 MG/DL (ref 65–99)
HDLC SERPL-MCNC: 45 MG/DL
LDLC SERPL CALC-MCNC: 120 MG/DL (CALC)
NONHDLC SERPL-MCNC: 145 MG/DL (CALC)
POTASSIUM SERPL-SCNC: 4.1 MMOL/L (ref 3.5–5.3)
PROT SERPL-MCNC: 7.2 G/DL (ref 6.1–8.1)
SODIUM SERPL-SCNC: 141 MMOL/L (ref 135–146)
TRIGL SERPL-MCNC: 140 MG/DL

## 2025-05-20 ENCOUNTER — OFFICE VISIT (OUTPATIENT)
Dept: FAMILY MEDICINE CLINIC | Facility: CLINIC | Age: 50
End: 2025-05-20

## 2025-05-20 VITALS
WEIGHT: 220 LBS | HEIGHT: 71 IN | RESPIRATION RATE: 16 BRPM | BODY MASS INDEX: 30.8 KG/M2 | OXYGEN SATURATION: 96 % | SYSTOLIC BLOOD PRESSURE: 130 MMHG | HEART RATE: 83 BPM | DIASTOLIC BLOOD PRESSURE: 70 MMHG

## 2025-05-20 DIAGNOSIS — Z00.00 ANNUAL PHYSICAL EXAM: ICD-10-CM

## 2025-05-20 DIAGNOSIS — I10 ESSENTIAL HYPERTENSION: ICD-10-CM

## 2025-05-20 DIAGNOSIS — E66.811 OBESITY (BMI 30.0-34.9): ICD-10-CM

## 2025-05-20 DIAGNOSIS — J45.30 MILD PERSISTENT ASTHMA WITHOUT COMPLICATION: ICD-10-CM

## 2025-05-20 DIAGNOSIS — E78.2 MIXED HYPERLIPIDEMIA: ICD-10-CM

## 2025-05-20 DIAGNOSIS — R73.01 IMPAIRED FASTING GLUCOSE: Primary | ICD-10-CM

## 2025-05-20 NOTE — PATIENT INSTRUCTIONS
"Patient Education     Routine physical for adults   The Basics   Written by the doctors and editors at [facility]   What is a physical? -- A physical is a routine visit, or \"check-up,\" with your doctor. You might also hear it called a \"wellness visit\" or \"preventive visit.\"  During each visit, the doctor will:   Ask about your physical and mental health   Ask about your habits, behaviors, and lifestyle   Do an exam   Give you vaccines if needed   Talk to you about any medicines you take   Give advice about your health   Answer your questions  Getting regular check-ups is an important part of taking care of your health. It can help your doctor find and treat any problems you have. But it's also important for preventing health problems.  A routine physical is different from a \"sick visit.\" A sick visit is when you see a doctor because of a health concern or problem. Since physicals are scheduled ahead of time, you can think about what you want to ask the doctor.  How often should I get a physical? -- It depends on your age and health. In general, for people age 21 years and older:   If you are younger than 50 years, you might be able to get a physical every 3 years.   If you are 50 years or older, your doctor might recommend a physical every year.  If you have an ongoing health condition, like diabetes or high blood pressure, your doctor will probably want to see you more often.  What happens during a physical? -- In general, each visit will include:   Physical exam - The doctor or nurse will check your height, weight, heart rate, and blood pressure. They will also look at your eyes and ears. They will ask about how you are feeling and whether you have any symptoms that bother you.   Medicines - It's a good idea to bring a list of all the medicines you take to each doctor visit. Your doctor will talk to you about your medicines and answer any questions. Tell them if you are having any side effects that bother you. You " "should also tell them if you are having trouble paying for any of your medicines.   Habits and behaviors - This includes:   Your diet   Your exercise habits   Whether you smoke, drink alcohol, or use drugs   Whether you are sexually active   Whether you feel safe at home  Your doctor will talk to you about things you can do to improve your health and lower your risk of health problems. They will also offer help and support. For example, if you want to quit smoking, they can give you advice and might prescribe medicines. If you want to improve your diet or get more physical activity, they can help you with this, too.   Lab tests, if needed - The tests you get will depend on your age and situation. For example, your doctor might want to check your:   Cholesterol   Blood sugar   Iron level   Vaccines - The recommended vaccines will depend on your age, health, and what vaccines you already had. Vaccines are very important because they can prevent certain serious or deadly infections.   Discussion of screening - \"Screening\" means checking for diseases or other health problems before they cause symptoms. Your doctor can recommend screening based on your age, risk, and preferences. This might include tests to check for:   Cancer, such as breast, prostate, cervical, ovarian, colorectal, prostate, lung, or skin cancer   Sexually transmitted infections, such as chlamydia and gonorrhea   Mental health conditions like depression and anxiety  Your doctor will talk to you about the different types of screening tests. They can help you decide which screenings to have. They can also explain what the results might mean.   Answering questions - The physical is a good time to ask the doctor or nurse questions about your health. If needed, they can refer you to other doctors or specialists, too.  Adults older than 65 years often need other care, too. As you get older, your doctor will talk to you about:   How to prevent falling at " home   Hearing or vision tests   Memory testing   How to take your medicines safely   Making sure that you have the help and support you need at home  All topics are updated as new evidence becomes available and our peer review process is complete.  This topic retrieved from [employer] on: May 02, 2024.  Topic 251071 Version 1.0  Release: 32.4.3 - C32.122  © 2024 UpToDate, Inc. and/or its affiliates. All rights reserved.  Consumer Information Use and Disclaimer   Disclaimer: This generalized information is a limited summary of diagnosis, treatment, and/or medication information. It is not meant to be comprehensive and should be used as a tool to help the user understand and/or assess potential diagnostic and treatment options. It does NOT include all information about conditions, treatments, medications, side effects, or risks that may apply to a specific patient. It is not intended to be medical advice or a substitute for the medical advice, diagnosis, or treatment of a health care provider based on the health care provider's examination and assessment of a patient's specific and unique circumstances. Patients must speak with a health care provider for complete information about their health, medical questions, and treatment options, including any risks or benefits regarding use of medications. This information does not endorse any treatments or medications as safe, effective, or approved for treating a specific patient. UpToDate, Inc. and its affiliates disclaim any warranty or liability relating to this information or the use thereof.The use of this information is governed by the Terms of Use, available at https://www.woltersBioquimicauwer.com/en/know/clinical-effectiveness-terms. 2024© UpToDate, Inc. and its affiliates and/or licensors. All rights reserved.  Copyright   © 2024 UpToDate, Inc. and/or its affiliates. All rights reserved.    Patient Education     Low-fat diet   The Basics   Written by the doctors and editors  "at [employer]   What are fats? -- The fat in the food you eat is often classified into 2 groups:   \"Healthy\" fats - These are monounsaturated or polyunsaturated fats. They tend to be more liquid at room temperature. Healthy fats are found in things like olive oil, canola oil, and sesame oil. They are also found in nuts, seeds, avocados, and nut butters.   \"Unhealthy\" fats - These are saturated and trans fats. Saturated fats are animal fats. Trans fats are artificial fats, like partially hydrogenated oils. These fats raise your cholesterol. Unhealthy fats tend to be more solid at room temperature. They are found in meats, egg yolks, butter, cheese, and full-fat milk products. They are also found in some fried foods, butter, margarine, and baked goods like cookies or cakes.  Why do I need a low-fat diet? -- The amounts and kinds of fats you eat can affect your health. This is especially true if you have specific conditions such as blocked arteries or gallbladder problems. Eating fewer unhealthy fats is 1 way to improve your health.  Some people try to eat less fat because they want to lose weight or avoid gaining weight. But this does not always work. That's because weight gain is related to how many calories you eat, no matter what foods they come from. Eating fewer unhealthy fats can be an important part of a weight loss plan. But it's also important to be aware of how many calories you are getting from other foods.  What can I eat and drink on a low-fat diet? -- Choose foods with healthy fats.  Foods with healthy fats include:   Canola, peanut, and olive oil   Safflower, sunflower, soybean, and corn oil   Walnuts, almonds, pecans, hazelnuts, cashews, and peanuts   Pumpkin, sesame, flax, and sunflower seeds   [place], tuna, and some other fish   Tofu   Soy milk   Avocado  Other healthy foods with lower amounts of fats include:   Fat-free (non-fat) or low-fat milk, yogurt, and cheese   Light, low-fat or fat-free cream " "cheese and sour cream   Dried beans, lentils, and tofu   Fruits and vegetables   Whole-grain breads, cereals, pastas, and rice   High-fiber foods, like oatmeal, fruits, beans, and nuts. These have soluble fiber, which helps lower cholesterol in the body.   Deli ham, turkey, chicken breast, and lean roast beef  What foods and drinks should I limit on a low-fat diet? -- It is important to limit certain foods with unhealthy fats.  Limit these types of foods that have saturated fats:   Whole-fat dairy products like cheese, ice cream, whole milk, and cream   High-fat meats like beef, lamb, poultry with the skin, belle, hot dogs, and sausage   Processed deli meats like bologna, pepperoni, and salami   Butter and lard   Palm and coconut oils   Mayonnaise, salad dressings, gravies, and sauces  Limit these types of foods that might have trans fats:   Granola, candy, and baked goods like cookies, cakes, doughnuts, and muffins   Pizza dough, and pie crusts that are packaged   Fried foods   Frozen dinners   Chips and crackers   Microwave popcorn   Stick margarine and vegetable shortenings  What else should I know? -- You do not have to remove all fat from your diet. Instead, pay attention to the amount and kinds of fats that you eat.   Read the labels of store-bought foods (figure 1) to find out how much fat is in each. Under 5 percent of total fat on a label means that it is \"low fat.\" Over 20 percent of total fat on a label means that it is \"high fat.\" Avoid foods with \"partially hydrogenated oil\" in the ingredient list. This means that there is trans fat in the food.   Change how you cook to help lower the amount of fat in your food:   Remove the fatty parts of meat and the skin from poultry before cooking   Bake, broil, grill, poach, or roast poultry, fish, and lean meats   Drain and throw away the fat that comes out of meat as you cook it   Try to add little or no fat to foods   Use olive or canola oil for cooking or " "baking   Steam your vegetables   Use herbs or no-oil marinades to flavor foods  All topics are updated as new evidence becomes available and our peer review process is complete.  This topic retrieved from [employer] on: Mar 13, 2024.  Topic 486452 Version 4.0  Release: 32.2.4 - C32.71  © 2024 UpToDate, Inc. and/or its affiliates. All rights reserved.  figure 1: Nutrition label - Fats     This is an example of a nutrition label. To figure out how much and what kinds of fats are in a food, look for the lines that say \"Saturated Fat\" and \"Trans Fat.\" It's also important to look at the serving size. This food has 3 grams of saturated fat and 2 grams of trans fat in each serving, and each serving is 2 tablespoons (32 grams).  Graphic 193795 Version 1.0  Consumer Information Use and Disclaimer   Disclaimer: This generalized information is a limited summary of diagnosis, treatment, and/or medication information. It is not meant to be comprehensive and should be used as a tool to help the user understand and/or assess potential diagnostic and treatment options. It does NOT include all information about conditions, treatments, medications, side effects, or risks that may apply to a specific patient. It is not intended to be medical advice or a substitute for the medical advice, diagnosis, or treatment of a health care provider based on the health care provider's examination and assessment of a patient's specific and unique circumstances. Patients must speak with a health care provider for complete information about their health, medical questions, and treatment options, including any risks or benefits regarding use of medications. This information does not endorse any treatments or medications as safe, effective, or approved for treating a specific patient. UpToDate, Inc. and its affiliates disclaim any warranty or liability relating to this information or the use thereof.The use of this information is governed by the Terms of " Use, available at https://www.w[employer] Cityuwer.com/en/know/clinical-effectiveness-terms. 2024© [employer] and its affiliates and/or licensors. All rights reserved.  Copyright   © 2024 [employer] and/or its affiliates. All rights reserved.

## 2025-05-20 NOTE — PROGRESS NOTES
Adult Annual Physical  Name: Mk Estrada      : 1975      MRN: [id number]  Encounter Provider: Liv Darby MD  Encounter Date: 2025   Encounter department: [facility]    :  Assessment & Plan  Impaired fasting glucose  Stable, cont low carb diet   Orders:  •  Comprehensive metabolic panel; Future  •  Hemoglobin A1C; Future    Mild persistent asthma without complication  Stable, use advair only once a day        Essential hypertension  Stable, cont same   Orders:  •  CBC and differential; Future  •  Comprehensive metabolic panel; Future  •  Hemoglobin A1C; Future  •  Lipid panel; Future  •  PSA, Total Screen; Future    Annual physical exam  Up to date on vaccination        Obesity (BMI 30.0-34.9)           Mixed hyperlipidemia  Cont same dose , advised low fat diet  Orders:  •  Lipid panel; Future    Natural tear for dry eyes     Preventive Screenings:  - Diabetes Screening: screening up-to-date and orders placed  - Cholesterol Screening: has hyperlipidemia and orders placed   - Hepatitis C screening: patient declines   - HIV screening: patient declines   - Colon cancer screening: screening up-to-date   - Lung cancer screening: screening not indicated   - Prostate cancer screening: risks/benefits discussed and orders placed     Counseling/Anticipatory Guidance:    - Dental health: discussed importance of regular tooth brushing, flossing, and dental visits.   - Diet: discussed recommendations for a healthy/well-balanced diet.   - Exercise: the importance of regular exercise/physical activity was discussed. Recommend exercise 3-5 times per week for at least 30 minutes.       Depression Screening and Follow-up Plan: Patient was screened for depression during today's encounter. They screened negative with a PHQ-2 score of 0.          History of Present Illness     Adult Annual Physical:  Patient presents for annual physical. Physical and follow up , htn , anxiety , impaired  glucose, , sleep apnea , asthma , stable , taking all meds , has itchy eyes .     Diet and Physical Activity:  - Diet/Nutrition: well balanced diet and consuming 3-5 servings of fruits/vegetables daily.  - Exercise: moderate cardiovascular exercise and 5-7 times a week on average.    Depression Screening:  - PHQ-2 Score: 0    General Health:  - Sleep: sleeps well, 7-8 hours of sleep on average and uses CPAP machine.  - Hearing: decreased hearing bilateral ears.  - Vision: most recent eye exam < 1 year ago and wears contacts.    /GYN Health:  - Follows with GYN: no.   - History of STDs: no     Health:  - History of STDs: no.   - Urinary symptoms: none.     Advanced Care Planning:  - Has an advanced directive?: no    - Has a durable medical POA?: no      Review of Systems   Constitutional:  Negative for activity change, appetite change, chills, fatigue, fever and unexpected weight change.   HENT:  Negative for congestion, ear discharge, ear pain, nosebleeds, postnasal drip, rhinorrhea, sinus pressure, sneezing, sore throat, trouble swallowing and voice change.    Eyes:  Negative for photophobia, pain, discharge, redness and itching.   Respiratory:  Negative for cough, chest tightness, shortness of breath and wheezing.    Cardiovascular:  Negative for chest pain, palpitations and leg swelling.   Gastrointestinal:  Negative for abdominal pain, constipation, diarrhea, nausea and vomiting.   Endocrine: Negative for polyuria.   Genitourinary:  Negative for dysuria, frequency and urgency.   Musculoskeletal:  Negative for arthralgias, back pain, myalgias and neck pain.   Skin:  Negative for color change, pallor and rash.   Allergic/Immunologic: Negative for environmental allergies and food allergies.   Neurological:  Negative for dizziness, weakness, light-headedness and headaches.   Hematological:  Negative for adenopathy. Does not bruise/bleed easily.   Psychiatric/Behavioral:  Negative for behavioral problems. The  "patient is not nervous/anxious.          Objective   /70   Pulse 83   Resp 16   Ht 5' 11\" (1.803 m)   Wt 99.8 kg (220 lb)   SpO2 96%   BMI 30.68 kg/m²     Physical Exam  Vitals and nursing note reviewed.   Constitutional:       Appearance: Normal appearance.   HENT:      Head: Normocephalic and atraumatic.      Right Ear: Tympanic membrane and ear canal normal. There is no impacted cerumen.      Left Ear: Tympanic membrane and ear canal normal. There is no impacted cerumen.      Nose: Nose normal. No rhinorrhea.      Mouth/Throat:      Mouth: Mucous membranes are moist.      Pharynx: Oropharynx is clear. No oropharyngeal exudate or posterior oropharyngeal erythema.     Eyes:      General:         Right eye: No discharge.         Left eye: No discharge.      Extraocular Movements: Extraocular movements intact.      Conjunctiva/sclera: Conjunctivae normal.      Pupils: Pupils are equal, round, and reactive to light.       Cardiovascular:      Rate and Rhythm: Normal rate and regular rhythm.      Heart sounds: No murmur heard.  Pulmonary:      Effort: Pulmonary effort is normal.      Breath sounds: Normal breath sounds. No wheezing or rales.   Abdominal:      General: Abdomen is flat. Bowel sounds are normal.      Palpations: Abdomen is soft. There is no mass.      Tenderness: There is no abdominal tenderness. There is no guarding.     Musculoskeletal:         General: No swelling, tenderness, deformity or signs of injury. Normal range of motion.      Cervical back: Normal range of motion and neck supple.   Lymphadenopathy:      Cervical: No cervical adenopathy.     Skin:     Coloration: Skin is not jaundiced or pale.      Findings: No rash.     Neurological:      General: No focal deficit present.      Mental Status: He is alert and oriented to person, place, and time.      Motor: No weakness.     Psychiatric:         Mood and Affect: Mood normal.         Behavior: Behavior normal.         Thought Content: " Thought content normal.         Judgment: Judgment normal.

## 2025-05-20 NOTE — ASSESSMENT & PLAN NOTE
Stable, cont low carb diet   Orders:  •  Comprehensive metabolic panel; Future  •  Hemoglobin A1C; Future

## 2025-05-20 NOTE — ASSESSMENT & PLAN NOTE
Stable, cont same   Orders:  •  CBC and differential; Future  •  Comprehensive metabolic panel; Future  •  Hemoglobin A1C; Future  •  Lipid panel; Future  •  PSA, Total Screen; Future

## 2025-08-01 DIAGNOSIS — J45.20 MILD INTERMITTENT ASTHMA WITHOUT COMPLICATION: ICD-10-CM

## 2025-08-04 DIAGNOSIS — E78.2 MIXED HYPERLIPIDEMIA: ICD-10-CM

## 2025-08-04 RX ORDER — ALBUTEROL SULFATE 90 UG/1
INHALANT RESPIRATORY (INHALATION)
Qty: 8.5 G | Refills: 1 | Status: SHIPPED | OUTPATIENT
Start: 2025-08-04

## 2025-08-06 RX ORDER — ATORVASTATIN CALCIUM 40 MG/1
40 TABLET, FILM COATED ORAL DAILY
Qty: 90 TABLET | Refills: 1 | Status: SHIPPED | OUTPATIENT
Start: 2025-08-06